# Patient Record
Sex: FEMALE | Race: WHITE | NOT HISPANIC OR LATINO | Employment: UNEMPLOYED | URBAN - METROPOLITAN AREA
[De-identification: names, ages, dates, MRNs, and addresses within clinical notes are randomized per-mention and may not be internally consistent; named-entity substitution may affect disease eponyms.]

---

## 2023-02-22 LAB
ALANINE AMINOTRANSFERASE (SGPT) (U/L) IN SER/PLAS: 13 U/L (ref 7–45)
ALBUMIN (G/DL) IN SER/PLAS: 4.7 G/DL (ref 3.4–5)
ALKALINE PHOSPHATASE (U/L) IN SER/PLAS: 99 U/L (ref 33–136)
ANION GAP IN SER/PLAS: 14 MMOL/L (ref 10–20)
ASPARTATE AMINOTRANSFERASE (SGOT) (U/L) IN SER/PLAS: 14 U/L (ref 9–39)
BASOPHILS (10*3/UL) IN BLOOD BY AUTOMATED COUNT: 0.04 X10E9/L (ref 0–0.1)
BASOPHILS/100 LEUKOCYTES IN BLOOD BY AUTOMATED COUNT: 0.5 % (ref 0–2)
BILIRUBIN TOTAL (MG/DL) IN SER/PLAS: 0.4 MG/DL (ref 0–1.2)
CALCIUM (MG/DL) IN SER/PLAS: 9.8 MG/DL (ref 8.6–10.6)
CARBON DIOXIDE, TOTAL (MMOL/L) IN SER/PLAS: 27 MMOL/L (ref 21–32)
CHLORIDE (MMOL/L) IN SER/PLAS: 105 MMOL/L (ref 98–107)
CREATININE (MG/DL) IN SER/PLAS: 0.74 MG/DL (ref 0.5–1.05)
EOSINOPHILS (10*3/UL) IN BLOOD BY AUTOMATED COUNT: 0.12 X10E9/L (ref 0–0.7)
EOSINOPHILS/100 LEUKOCYTES IN BLOOD BY AUTOMATED COUNT: 1.4 % (ref 0–6)
ERYTHROCYTE DISTRIBUTION WIDTH (RATIO) BY AUTOMATED COUNT: 13.5 % (ref 11.5–14.5)
ERYTHROCYTE MEAN CORPUSCULAR HEMOGLOBIN CONCENTRATION (G/DL) BY AUTOMATED: 33.2 G/DL (ref 32–36)
ERYTHROCYTE MEAN CORPUSCULAR VOLUME (FL) BY AUTOMATED COUNT: 94 FL (ref 80–100)
ERYTHROCYTES (10*6/UL) IN BLOOD BY AUTOMATED COUNT: 4.79 X10E12/L (ref 4–5.2)
GFR FEMALE: >90 ML/MIN/1.73M2
GLUCOSE (MG/DL) IN SER/PLAS: 81 MG/DL (ref 74–99)
HEMATOCRIT (%) IN BLOOD BY AUTOMATED COUNT: 45.2 % (ref 36–46)
HEMOGLOBIN (G/DL) IN BLOOD: 15 G/DL (ref 12–16)
IMMATURE GRANULOCYTES/100 LEUKOCYTES IN BLOOD BY AUTOMATED COUNT: 0.4 % (ref 0–0.9)
LEUKOCYTES (10*3/UL) IN BLOOD BY AUTOMATED COUNT: 8.4 X10E9/L (ref 4.4–11.3)
LYMPHOCYTES (10*3/UL) IN BLOOD BY AUTOMATED COUNT: 2.16 X10E9/L (ref 1.2–4.8)
LYMPHOCYTES/100 LEUKOCYTES IN BLOOD BY AUTOMATED COUNT: 25.7 % (ref 13–44)
MONOCYTES (10*3/UL) IN BLOOD BY AUTOMATED COUNT: 0.68 X10E9/L (ref 0.1–1)
MONOCYTES/100 LEUKOCYTES IN BLOOD BY AUTOMATED COUNT: 8.1 % (ref 2–10)
NEUTROPHILS (10*3/UL) IN BLOOD BY AUTOMATED COUNT: 5.37 X10E9/L (ref 1.2–7.7)
NEUTROPHILS/100 LEUKOCYTES IN BLOOD BY AUTOMATED COUNT: 63.9 % (ref 40–80)
NRBC (PER 100 WBCS) BY AUTOMATED COUNT: 0 /100 WBC (ref 0–0)
PLATELETS (10*3/UL) IN BLOOD AUTOMATED COUNT: 217 X10E9/L (ref 150–450)
POTASSIUM (MMOL/L) IN SER/PLAS: 4.1 MMOL/L (ref 3.5–5.3)
PROTEIN TOTAL: 7.3 G/DL (ref 6.4–8.2)
SODIUM (MMOL/L) IN SER/PLAS: 142 MMOL/L (ref 136–145)
UREA NITROGEN (MG/DL) IN SER/PLAS: 9 MG/DL (ref 6–23)

## 2023-06-15 LAB
ALANINE AMINOTRANSFERASE (SGPT) (U/L) IN SER/PLAS: 12 U/L (ref 7–45)
ALBUMIN (G/DL) IN SER/PLAS: 3.9 G/DL (ref 3.4–5)
ALKALINE PHOSPHATASE (U/L) IN SER/PLAS: 72 U/L (ref 33–136)
ANION GAP IN SER/PLAS: 9 MMOL/L (ref 10–20)
ASPARTATE AMINOTRANSFERASE (SGOT) (U/L) IN SER/PLAS: 10 U/L (ref 9–39)
BASOPHILS (10*3/UL) IN BLOOD BY AUTOMATED COUNT: 0.04 X10E9/L (ref 0–0.1)
BASOPHILS/100 LEUKOCYTES IN BLOOD BY AUTOMATED COUNT: 0.5 % (ref 0–2)
BILIRUBIN TOTAL (MG/DL) IN SER/PLAS: 0.2 MG/DL (ref 0–1.2)
CALCIUM (MG/DL) IN SER/PLAS: 9.1 MG/DL (ref 8.6–10.3)
CARBON DIOXIDE, TOTAL (MMOL/L) IN SER/PLAS: 28 MMOL/L (ref 21–32)
CHLORIDE (MMOL/L) IN SER/PLAS: 108 MMOL/L (ref 98–107)
CREATININE (MG/DL) IN SER/PLAS: 0.7 MG/DL (ref 0.5–1.05)
EOSINOPHILS (10*3/UL) IN BLOOD BY AUTOMATED COUNT: 0.15 X10E9/L (ref 0–0.7)
EOSINOPHILS/100 LEUKOCYTES IN BLOOD BY AUTOMATED COUNT: 2 % (ref 0–6)
ERYTHROCYTE DISTRIBUTION WIDTH (RATIO) BY AUTOMATED COUNT: 13.8 % (ref 11.5–14.5)
ERYTHROCYTE MEAN CORPUSCULAR HEMOGLOBIN CONCENTRATION (G/DL) BY AUTOMATED: 32.6 G/DL (ref 32–36)
ERYTHROCYTE MEAN CORPUSCULAR VOLUME (FL) BY AUTOMATED COUNT: 97 FL (ref 80–100)
ERYTHROCYTES (10*6/UL) IN BLOOD BY AUTOMATED COUNT: 4.15 X10E12/L (ref 4–5.2)
GFR FEMALE: >90 ML/MIN/1.73M2
GLUCOSE (MG/DL) IN SER/PLAS: 104 MG/DL (ref 74–99)
HEMATOCRIT (%) IN BLOOD BY AUTOMATED COUNT: 40.2 % (ref 36–46)
HEMOGLOBIN (G/DL) IN BLOOD: 13.1 G/DL (ref 12–16)
IMMATURE GRANULOCYTES/100 LEUKOCYTES IN BLOOD BY AUTOMATED COUNT: 0.5 % (ref 0–0.9)
LEUKOCYTES (10*3/UL) IN BLOOD BY AUTOMATED COUNT: 7.5 X10E9/L (ref 4.4–11.3)
LYMPHOCYTES (10*3/UL) IN BLOOD BY AUTOMATED COUNT: 1.89 X10E9/L (ref 1.2–4.8)
LYMPHOCYTES/100 LEUKOCYTES IN BLOOD BY AUTOMATED COUNT: 25.3 % (ref 13–44)
MONOCYTES (10*3/UL) IN BLOOD BY AUTOMATED COUNT: 0.59 X10E9/L (ref 0.1–1)
MONOCYTES/100 LEUKOCYTES IN BLOOD BY AUTOMATED COUNT: 7.9 % (ref 2–10)
NEUTROPHILS (10*3/UL) IN BLOOD BY AUTOMATED COUNT: 4.77 X10E9/L (ref 1.2–7.7)
NEUTROPHILS/100 LEUKOCYTES IN BLOOD BY AUTOMATED COUNT: 63.8 % (ref 40–80)
PLATELETS (10*3/UL) IN BLOOD AUTOMATED COUNT: 189 X10E9/L (ref 150–450)
POTASSIUM (MMOL/L) IN SER/PLAS: 3.9 MMOL/L (ref 3.5–5.3)
PROTEIN TOTAL: 6.4 G/DL (ref 6.4–8.2)
SODIUM (MMOL/L) IN SER/PLAS: 141 MMOL/L (ref 136–145)
UREA NITROGEN (MG/DL) IN SER/PLAS: 11 MG/DL (ref 6–23)

## 2023-08-08 LAB
ALANINE AMINOTRANSFERASE (SGPT) (U/L) IN SER/PLAS: 26 U/L (ref 7–45)
ALBUMIN (G/DL) IN SER/PLAS: 4.8 G/DL (ref 3.4–5)
ALKALINE PHOSPHATASE (U/L) IN SER/PLAS: 80 U/L (ref 33–136)
ANION GAP IN SER/PLAS: 16 MMOL/L (ref 10–20)
ASPARTATE AMINOTRANSFERASE (SGOT) (U/L) IN SER/PLAS: 18 U/L (ref 9–39)
BASOPHILS (10*3/UL) IN BLOOD BY AUTOMATED COUNT: 0.05 X10E9/L (ref 0–0.1)
BASOPHILS/100 LEUKOCYTES IN BLOOD BY AUTOMATED COUNT: 0.4 % (ref 0–2)
BILIRUBIN TOTAL (MG/DL) IN SER/PLAS: 0.4 MG/DL (ref 0–1.2)
CALCIUM (MG/DL) IN SER/PLAS: 9.9 MG/DL (ref 8.6–10.6)
CARBON DIOXIDE, TOTAL (MMOL/L) IN SER/PLAS: 25 MMOL/L (ref 21–32)
CHLORIDE (MMOL/L) IN SER/PLAS: 104 MMOL/L (ref 98–107)
CREATININE (MG/DL) IN SER/PLAS: 0.79 MG/DL (ref 0.5–1.05)
EOSINOPHILS (10*3/UL) IN BLOOD BY AUTOMATED COUNT: 0.04 X10E9/L (ref 0–0.7)
EOSINOPHILS/100 LEUKOCYTES IN BLOOD BY AUTOMATED COUNT: 0.3 % (ref 0–6)
ERYTHROCYTE DISTRIBUTION WIDTH (RATIO) BY AUTOMATED COUNT: 12.9 % (ref 11.5–14.5)
ERYTHROCYTE MEAN CORPUSCULAR HEMOGLOBIN CONCENTRATION (G/DL) BY AUTOMATED: 32.8 G/DL (ref 32–36)
ERYTHROCYTE MEAN CORPUSCULAR VOLUME (FL) BY AUTOMATED COUNT: 94 FL (ref 80–100)
ERYTHROCYTES (10*6/UL) IN BLOOD BY AUTOMATED COUNT: 4.92 X10E12/L (ref 4–5.2)
GFR FEMALE: 85 ML/MIN/1.73M2
GLUCOSE (MG/DL) IN SER/PLAS: 94 MG/DL (ref 74–99)
HEMATOCRIT (%) IN BLOOD BY AUTOMATED COUNT: 46.4 % (ref 36–46)
HEMOGLOBIN (G/DL) IN BLOOD: 15.2 G/DL (ref 12–16)
IMMATURE GRANULOCYTES/100 LEUKOCYTES IN BLOOD BY AUTOMATED COUNT: 0.4 % (ref 0–0.9)
LEUKOCYTES (10*3/UL) IN BLOOD BY AUTOMATED COUNT: 11.5 X10E9/L (ref 4.4–11.3)
LYMPHOCYTES (10*3/UL) IN BLOOD BY AUTOMATED COUNT: 2.39 X10E9/L (ref 1.2–4.8)
LYMPHOCYTES/100 LEUKOCYTES IN BLOOD BY AUTOMATED COUNT: 20.8 % (ref 13–44)
MONOCYTES (10*3/UL) IN BLOOD BY AUTOMATED COUNT: 0.53 X10E9/L (ref 0.1–1)
MONOCYTES/100 LEUKOCYTES IN BLOOD BY AUTOMATED COUNT: 4.6 % (ref 2–10)
NEUTROPHILS (10*3/UL) IN BLOOD BY AUTOMATED COUNT: 8.43 X10E9/L (ref 1.2–7.7)
NEUTROPHILS/100 LEUKOCYTES IN BLOOD BY AUTOMATED COUNT: 73.5 % (ref 40–80)
NRBC (PER 100 WBCS) BY AUTOMATED COUNT: 0 /100 WBC (ref 0–0)
PLATELETS (10*3/UL) IN BLOOD AUTOMATED COUNT: 222 X10E9/L (ref 150–450)
POTASSIUM (MMOL/L) IN SER/PLAS: 3.9 MMOL/L (ref 3.5–5.3)
PROTEIN TOTAL: 7.4 G/DL (ref 6.4–8.2)
SODIUM (MMOL/L) IN SER/PLAS: 141 MMOL/L (ref 136–145)
UREA NITROGEN (MG/DL) IN SER/PLAS: 8 MG/DL (ref 6–23)

## 2023-11-12 PROBLEM — K74.00 LIVER FIBROSIS: Status: ACTIVE | Noted: 2023-11-12

## 2023-11-12 PROBLEM — L30.9 DERMATITIS, UNSPECIFIED: Status: ACTIVE | Noted: 2023-08-10

## 2023-11-12 PROBLEM — D18.01 HEMANGIOMA OF SKIN AND SUBCUTANEOUS TISSUE: Status: ACTIVE | Noted: 2023-08-10

## 2023-11-12 PROBLEM — L81.4 OTHER MELANIN HYPERPIGMENTATION: Status: ACTIVE | Noted: 2023-08-10

## 2023-11-12 PROBLEM — Z85.828 PERSONAL HISTORY OF OTHER MALIGNANT NEOPLASM OF SKIN: Status: ACTIVE | Noted: 2023-08-10

## 2023-11-12 PROBLEM — C86.6 PRIMARY CUTANEOUS CD30-POSITIVE T-CELL PROLIFERATIONS (MULTI): Status: ACTIVE | Noted: 2023-08-10

## 2023-11-12 PROBLEM — L57.9 SKIN CHANGES DUE TO CHRONIC EXPOSURE TO NONIONIZING RADIATION, UNSPECIFIED: Status: ACTIVE | Noted: 2023-08-10

## 2023-11-12 PROBLEM — C86.60 PRIMARY CUTANEOUS CD30-POSITIVE T-CELL PROLIFERATIONS: Status: ACTIVE | Noted: 2023-08-10

## 2023-11-12 PROBLEM — D36.9 BENIGN NEOPLASM: Status: ACTIVE | Noted: 2023-08-10

## 2023-11-12 RX ORDER — CLINDAMYCIN PHOSPHATE 10 UG/ML
1 LOTION TOPICAL
COMMUNITY
Start: 2016-09-27

## 2023-11-12 RX ORDER — TRIAMCINOLONE ACETONIDE 0.25 MG/G
1 CREAM TOPICAL
COMMUNITY
Start: 2020-02-12

## 2023-11-12 RX ORDER — FOLIC ACID 1 MG/1
1 TABLET ORAL
COMMUNITY
Start: 2016-09-28

## 2023-11-12 RX ORDER — FLUOCINONIDE 0.5 MG/G
1 CREAM TOPICAL
COMMUNITY
Start: 2016-09-27

## 2023-12-04 ENCOUNTER — TELEPHONE (OUTPATIENT)
Dept: DERMATOLOGY | Facility: CLINIC | Age: 63
End: 2023-12-04
Payer: COMMERCIAL

## 2023-12-04 NOTE — TELEPHONE ENCOUNTER
Pt left message that Dr Redman had referred her to a Liver specialist , alex lost the information and would like to know who the Dr was  that she was supposed to see .

## 2023-12-07 ENCOUNTER — TELEPHONE (OUTPATIENT)
Dept: DERMATOLOGY | Facility: CLINIC | Age: 63
End: 2023-12-07
Payer: COMMERCIAL

## 2023-12-19 ENCOUNTER — HOSPITAL ENCOUNTER (EMERGENCY)
Facility: HOSPITAL | Age: 63
Discharge: HOME | End: 2023-12-19
Payer: COMMERCIAL

## 2023-12-19 ENCOUNTER — APPOINTMENT (OUTPATIENT)
Dept: RADIOLOGY | Facility: HOSPITAL | Age: 63
End: 2023-12-19
Payer: COMMERCIAL

## 2023-12-19 VITALS
SYSTOLIC BLOOD PRESSURE: 124 MMHG | OXYGEN SATURATION: 99 % | DIASTOLIC BLOOD PRESSURE: 75 MMHG | RESPIRATION RATE: 16 BRPM | HEIGHT: 62 IN | WEIGHT: 200 LBS | HEART RATE: 94 BPM | TEMPERATURE: 98 F | BODY MASS INDEX: 36.8 KG/M2

## 2023-12-19 DIAGNOSIS — S82.841A BIMALLEOLAR FRACTURE OF RIGHT ANKLE, CLOSED, INITIAL ENCOUNTER: Primary | ICD-10-CM

## 2023-12-19 PROCEDURE — 73610 X-RAY EXAM OF ANKLE: CPT | Mod: RT

## 2023-12-19 PROCEDURE — 73610 X-RAY EXAM OF ANKLE: CPT | Mod: RIGHT SIDE | Performed by: RADIOLOGY

## 2023-12-19 PROCEDURE — 2500000004 HC RX 250 GENERAL PHARMACY W/ HCPCS (ALT 636 FOR OP/ED)

## 2023-12-19 PROCEDURE — 96372 THER/PROPH/DIAG INJ SC/IM: CPT

## 2023-12-19 PROCEDURE — 2500000001 HC RX 250 WO HCPCS SELF ADMINISTERED DRUGS (ALT 637 FOR MEDICARE OP)

## 2023-12-19 PROCEDURE — 99284 EMERGENCY DEPT VISIT MOD MDM: CPT

## 2023-12-19 PROCEDURE — 99284 EMERGENCY DEPT VISIT MOD MDM: CPT | Mod: 25

## 2023-12-19 PROCEDURE — 73630 X-RAY EXAM OF FOOT: CPT | Mod: RIGHT SIDE | Performed by: RADIOLOGY

## 2023-12-19 PROCEDURE — 73630 X-RAY EXAM OF FOOT: CPT | Mod: RT

## 2023-12-19 PROCEDURE — 29515 APPLICATION SHORT LEG SPLINT: CPT | Mod: RT

## 2023-12-19 RX ORDER — DOCUSATE SODIUM 250 MG
250 CAPSULE ORAL DAILY PRN
Qty: 20 CAPSULE | Refills: 0 | Status: SHIPPED | OUTPATIENT
Start: 2023-12-19

## 2023-12-19 RX ORDER — MORPHINE SULFATE 4 MG/ML
INJECTION, SOLUTION INTRAMUSCULAR; INTRAVENOUS
Status: COMPLETED
Start: 2023-12-19 | End: 2023-12-19

## 2023-12-19 RX ORDER — IBUPROFEN 600 MG/1
600 TABLET ORAL ONCE
Status: COMPLETED | OUTPATIENT
Start: 2023-12-19 | End: 2023-12-19

## 2023-12-19 RX ORDER — OXYCODONE AND ACETAMINOPHEN 5; 325 MG/1; MG/1
1-2 TABLET ORAL EVERY 6 HOURS PRN
Qty: 15 TABLET | Refills: 0 | Status: SHIPPED | OUTPATIENT
Start: 2023-12-19

## 2023-12-19 RX ORDER — IBUPROFEN 600 MG/1
600 TABLET ORAL EVERY 6 HOURS PRN
Qty: 30 TABLET | Refills: 0 | Status: SHIPPED | OUTPATIENT
Start: 2023-12-19

## 2023-12-19 RX ORDER — MORPHINE SULFATE 4 MG/ML
4 INJECTION, SOLUTION INTRAMUSCULAR; INTRAVENOUS ONCE
Status: COMPLETED | OUTPATIENT
Start: 2023-12-19 | End: 2023-12-19

## 2023-12-19 RX ORDER — OXYCODONE AND ACETAMINOPHEN 5; 325 MG/1; MG/1
2 TABLET ORAL ONCE
Status: COMPLETED | OUTPATIENT
Start: 2023-12-19 | End: 2023-12-19

## 2023-12-19 RX ADMIN — MORPHINE SULFATE 4 MG: 4 INJECTION, SOLUTION INTRAMUSCULAR; INTRAVENOUS at 18:42

## 2023-12-19 RX ADMIN — IBUPROFEN 600 MG: 600 TABLET ORAL at 17:09

## 2023-12-19 RX ADMIN — OXYCODONE HYDROCHLORIDE AND ACETAMINOPHEN 2 TABLET: 5; 325 TABLET ORAL at 17:10

## 2023-12-19 ASSESSMENT — PAIN DESCRIPTION - LOCATION
LOCATION: ANKLE
LOCATION: ANKLE

## 2023-12-19 ASSESSMENT — PAIN DESCRIPTION - PAIN TYPE: TYPE: ACUTE PAIN

## 2023-12-19 ASSESSMENT — PAIN DESCRIPTION - ORIENTATION: ORIENTATION: RIGHT

## 2023-12-19 ASSESSMENT — PAIN - FUNCTIONAL ASSESSMENT
PAIN_FUNCTIONAL_ASSESSMENT: 0-10

## 2023-12-19 ASSESSMENT — PAIN SCALES - GENERAL
PAINLEVEL_OUTOF10: 10 - WORST POSSIBLE PAIN
PAINLEVEL_OUTOF10: 2
PAINLEVEL_OUTOF10: 10 - WORST POSSIBLE PAIN
PAINLEVEL_OUTOF10: 10 - WORST POSSIBLE PAIN

## 2023-12-19 NOTE — ED PROVIDER NOTES
Chief Complaint   Patient presents with    right ankle pain       63-year-old female arrives to the emergency department chief complaint of right ankle pain.  The patient was cleaning off her car, when she was walking around the car for the second time she slipped, states that she heard 2 pops and a crack, and fell to the ground.  Patient thought that she may have sprained her right ankle, however over the next couple of hours the swelling became profound, patient endorses a 10 out of 10 pain.  Patient is neurovascular intact in the right lower extremity.  Patient denies any other complaints or injury related to the fall.  Patient has significant circumferential soft tissue swelling around the ankle worse around the lateral malleolus.  The patient has been able to bear weight with significant pain while walking on her heel, pain is worse with flexion or extension.      History provided by:  Patient   used: No         PmHx, PsHx, Allergies, Family Hx, social Hx reviewed as documented    Given the focused nature of the complaint, only related components review of systems were evaluated, and abnormalities indicated in HPI    Physical Exam:    General: Patient is AAOx3, appears well developed, well nourished, is a good historian, answers questions appropriately    HEENT: head normocephalic, atraumatic, PERRLA, EOMs intact, oropharynx without erythema or exudate, buccal mucosa intact without lesions, TMs unremarkable, nose is patent bilateral    Pulmonary: CTAB, no accessory muscle use, able to speak full clear sentences    Cardiac: HRRR, no murmurs, rubs or gallops    GI: soft, non-tender, non-distended    Musculoskeletal: Right ankle pain per HPI, otherwise BEEBE, no joint effusions, clubbing or edema noted    Skin: Soft tissue swelling around the right ankle per HPI, otherwise intact, no lesions or rashes noted, turgor is good.    Medical Decision Making  This patient was seen in the emergency  department with an attending physician available at all times throughout their ED course    Primary consideration for this patient would be a right ankle sprain versus fracture, an x-ray will be used to further evaluate.  For the patient's 10 out of 10 pain, patient given 5-3 25 Percocet x 2 as well as 600 mg of ibuprofen    The patient's x-ray shows a bimalleolar fracture that is minimally displaced.  I consulted Dr. Ricks with orthopedics, the patient's imaging was reviewed, he recommended that the patient be placed in a posterior short leg with stirrup, nonweightbearing, placed on crutches and follow-up in 1 to 2 weeks to allow the swelling to minimize.      Prior to splinting, patient given an additional 4 mg of IM morphine for her pain.  Patient continues to endorse a 10 out of 10 pain despite medications given previously.    The patient approximately 40 minutes after the morphine tolerated splinting well, please see procedure note.  This was assisted by the medical student.  Patient is neurovascularly intact before and after the splint placement.    Primary nursing staff fitted patient with crutches, though difficult for the patient to ambulate with crutches, patient was able to ambulate.  Patient given a prescription of 600 mg ibuprofen as well as Percocet for breakthrough pain and Colace to be used to minimize constipation.    Patient will follow-up with Dr. Ricks calling tomorrow morning to establish an appointment    Patient is amenable to the plan of discharge as outlined above, all patient's questions pertaining to their ED course were answered in their entirety.  Strict return precautions were discussed with the patient and they verbalized understanding.  Further, it was made clear to the patient that from an emergent basis, all effort and testing was done to eliminate any imminent dangerous or potentially dangerous conditions of the patient however if their symptoms get much worse or feel  life-threatening, they are to return to the emergency department or call 911 immediately.    Amount and/or Complexity of Data Reviewed  Radiology: ordered. Decision-making details documented in ED Course.     Splint Application    Performed by: SERENA Painter  Authorized by: SERENA Painter    Consent:     Consent obtained:  Verbal    Consent given by:  Patient    Risks, benefits, and alternatives were discussed: yes      Risks discussed:  Discoloration, numbness, pain and swelling    Alternatives discussed:  No treatment  Universal protocol:     Procedure explained and questions answered to patient or proxy's satisfaction: yes      Relevant documents present and verified: yes      Test results available: yes      Imaging studies available: yes      Required blood products, implants, devices, and special equipment available: yes      Site/side marked: yes      Immediately prior to procedure a time out was called: yes      Patient identity confirmed:  Verbally with patient, arm band and hospital-assigned identification number  Pre-procedure details:     Distal neurologic exam:  Normal    Distal perfusion: distal pulses strong and brisk capillary refill    Procedure details:     Location:  Ankle    Ankle location:  R ankle    Strapping: no      Splint type:  Short leg and ankle stirrup    Supplies used: Ortho-Glass, Webril, Ace wrap.    Attestation: Splint applied and adjusted personally by me    Post-procedure details:     Distal neurologic exam:  Normal    Distal perfusion: distal pulses strong and brisk capillary refill      Procedure completion:  Tolerated well, no immediate complications    Post-procedure imaging: not applicable       Diagnoses as of 12/19/23 1957   Bimalleolar fracture of right ankle, closed, initial encounter       The patient has had the following imaging during this ER visit: XR ANKLE RIGHT 3+ VIEWS  XR FOOT RIGHT 3+ VIEWS     Patient History   No past medical history on  "file.  No past surgical history on file.  No family history on file.  Social History     Tobacco Use    Smoking status: Not on file    Smokeless tobacco: Not on file   Substance Use Topics    Alcohol use: Not on file    Drug use: Not on file       ED Triage Vitals [12/19/23 1648]   Temp Heart Rate Resp BP   36.7 °C (98 °F) 108 16 120/62      SpO2 Temp src Heart Rate Source Patient Position   98 % -- -- --      BP Location FiO2 (%)     -- --       Vitals:    12/19/23 1648 12/19/23 1840   BP: 120/62 124/75   Pulse: 108 94   Resp: 16    Temp: 36.7 °C (98 °F)    SpO2: 98% 99%   Weight: 90.7 kg (200 lb)    Height: 1.575 m (5' 2\")                [place], APRN-CNP  12/19/23 1957    "

## 2023-12-21 ENCOUNTER — TELEPHONE (OUTPATIENT)
Dept: ORTHOPEDIC SURGERY | Facility: CLINIC | Age: 63
End: 2023-12-21

## 2023-12-21 ENCOUNTER — OFFICE VISIT (OUTPATIENT)
Dept: ORTHOPEDIC SURGERY | Facility: CLINIC | Age: 63
End: 2023-12-21
Payer: COMMERCIAL

## 2023-12-21 VITALS — BODY MASS INDEX: 35.44 KG/M2 | HEIGHT: 63 IN | WEIGHT: 200 LBS

## 2023-12-21 DIAGNOSIS — S82.841A BIMALLEOLAR ANKLE FRACTURE, RIGHT, CLOSED, INITIAL ENCOUNTER: ICD-10-CM

## 2023-12-21 DIAGNOSIS — S82.841A BIMALLEOLAR ANKLE FRACTURE, RIGHT, CLOSED, INITIAL ENCOUNTER: Primary | ICD-10-CM

## 2023-12-21 PROCEDURE — 99203 OFFICE O/P NEW LOW 30 MIN: CPT | Performed by: SPECIALIST

## 2023-12-21 RX ORDER — ESCITALOPRAM OXALATE 20 MG/1
TABLET ORAL
COMMUNITY
Start: 2023-11-27

## 2023-12-21 RX ORDER — BUSPIRONE HYDROCHLORIDE 15 MG/1
TABLET ORAL
COMMUNITY
Start: 2023-10-02

## 2023-12-21 RX ORDER — RISPERIDONE 2 MG/1
TABLET ORAL
COMMUNITY
Start: 2023-11-27

## 2023-12-21 RX ORDER — LOSARTAN POTASSIUM 100 MG/1
1 TABLET ORAL DAILY
COMMUNITY
Start: 2019-04-25

## 2023-12-21 RX ORDER — ERGOCALCIFEROL 1.25 1/1
CAPSULE ORAL
COMMUNITY
Start: 2023-06-20

## 2023-12-21 RX ORDER — METHOTREXATE 2.5 MG/1
TABLET ORAL
COMMUNITY
Start: 2023-11-27

## 2023-12-21 RX ORDER — OXYCODONE AND ACETAMINOPHEN 5; 325 MG/1; MG/1
1 TABLET ORAL EVERY 6 HOURS PRN
Qty: 20 TABLET | Refills: 0 | Status: SHIPPED | OUTPATIENT
Start: 2023-12-21 | End: 2023-12-28

## 2023-12-21 RX ORDER — HYDROXYZINE PAMOATE 25 MG/1
CAPSULE ORAL
COMMUNITY
Start: 2023-10-30

## 2023-12-21 RX ORDER — AMLODIPINE BESYLATE 5 MG/1
1 TABLET ORAL DAILY
COMMUNITY
Start: 2019-03-28

## 2023-12-21 RX ORDER — BUSPIRONE HYDROCHLORIDE 10 MG/1
10 TABLET ORAL 2 TIMES DAILY
COMMUNITY
Start: 2022-12-12

## 2023-12-21 RX ORDER — ESCITALOPRAM OXALATE 10 MG/1
TABLET ORAL
COMMUNITY
Start: 2023-06-29

## 2023-12-21 NOTE — PROGRESS NOTES
Right ankle pain - states she slipped and fell 12/19/2023  Went to the ER - xrays done   NWB in a splint, states she has been using crutches.   Taking Ibuprofen and Percocet for pain.       On examination of the right ankle/foot:  Antalgic gait, nonweightbearing in splint  Alignment well aligned in splint.    Neurovascularly intact.  No sensory deficit to light touch.  Good perfusion and motor to toes  I personally reviewed the patient's x-ray images and reports of the right ankle.  She has a nondisplaced bimalleolar ankle fracture.  ASSESSMENT: Right bimalleolar ankle fracture 2 days out in splint well reduced.  She will remain nonweightbearing in the splint we will see her back in 2 weeks for repeat x-rays.  Despite it being nondisplaced these have a history of slipping out of anatomic position and therefore we will follow closely.  She understands it may require surgery in the future but she would be high risk with her current tobacco use.     FOLLOW UP 2 weeks repeat 3 views nonweightbearing in splint    This note was dictated using speech recognition software and was not corrected for spelling or grammatical errors

## 2023-12-21 NOTE — TELEPHONE ENCOUNTER
Patient seen today for Bimalleolar Ankle fracture   Asking for a prescription for pain medication   [phone] [facility]

## 2023-12-29 DIAGNOSIS — S82.841A BIMALLEOLAR ANKLE FRACTURE, RIGHT, CLOSED, INITIAL ENCOUNTER: ICD-10-CM

## 2024-01-03 ENCOUNTER — OFFICE VISIT (OUTPATIENT)
Dept: ORTHOPEDIC SURGERY | Facility: CLINIC | Age: 64
End: 2024-01-03
Payer: COMMERCIAL

## 2024-01-03 ENCOUNTER — HOSPITAL ENCOUNTER (OUTPATIENT)
Dept: RADIOLOGY | Facility: HOSPITAL | Age: 64
Discharge: HOME | End: 2024-01-03
Payer: COMMERCIAL

## 2024-01-03 VITALS — WEIGHT: 200 LBS | BODY MASS INDEX: 35.44 KG/M2 | HEIGHT: 63 IN

## 2024-01-03 DIAGNOSIS — S82.841A BIMALLEOLAR ANKLE FRACTURE, RIGHT, CLOSED, INITIAL ENCOUNTER: ICD-10-CM

## 2024-01-03 DIAGNOSIS — S82.841A BIMALLEOLAR ANKLE FRACTURE, RIGHT, CLOSED, INITIAL ENCOUNTER: Primary | ICD-10-CM

## 2024-01-03 PROCEDURE — 99213 OFFICE O/P EST LOW 20 MIN: CPT | Performed by: SPECIALIST

## 2024-01-03 PROCEDURE — 73610 X-RAY EXAM OF ANKLE: CPT | Mod: RIGHT SIDE | Performed by: RADIOLOGY

## 2024-01-03 PROCEDURE — L4361 PNEUMA/VAC WALK BOOT PRE OTS: HCPCS | Performed by: SPECIALIST

## 2024-01-03 PROCEDURE — 73610 X-RAY EXAM OF ANKLE: CPT | Mod: RT

## 2024-01-03 NOTE — PROGRESS NOTES
Follow up Right ankle fracture 12/19/2023  NWB in her splint   Xrays done today - she is not feeling any better     Patient is being treated for right by mall ankle fracture nonsurgically because of some comorbidities to include tobacco use.  She states she has been nonweightbearing.    Exam: With the right short leg splint removed she has minimal swelling intact dermis normal neurovascular status.  She does have pain bimalleolar palpation of the ankle.  No pain over Lisfranc's or proximal leg regions.  Active motion intact with mild pain.    Radiographs: Shows the bimalleolar fracture of the right ankle but still with the maintained ankle mortise and minimal to no displacement of the fractures    Assessment/plan: Right bimalleolar ankle fracture.  She was placed into a fracture boot toe-touch weightbearing only.  She understands her still a risk of delayed/malunion.  Would like to reassess with 3 views nonweightbearing x-rays right ankle in 2 weeks.

## 2024-01-11 DIAGNOSIS — S82.841A BIMALLEOLAR ANKLE FRACTURE, RIGHT, CLOSED, INITIAL ENCOUNTER: ICD-10-CM

## 2024-01-16 ENCOUNTER — OFFICE VISIT (OUTPATIENT)
Dept: ORTHOPEDIC SURGERY | Facility: CLINIC | Age: 64
End: 2024-01-16
Payer: COMMERCIAL

## 2024-01-16 ENCOUNTER — HOSPITAL ENCOUNTER (OUTPATIENT)
Dept: RADIOLOGY | Facility: HOSPITAL | Age: 64
Discharge: HOME | End: 2024-01-16
Payer: COMMERCIAL

## 2024-01-16 VITALS — HEIGHT: 63 IN | BODY MASS INDEX: 35.44 KG/M2 | WEIGHT: 200 LBS

## 2024-01-16 DIAGNOSIS — S82.841A BIMALLEOLAR ANKLE FRACTURE, RIGHT, CLOSED, INITIAL ENCOUNTER: ICD-10-CM

## 2024-01-16 DIAGNOSIS — S82.841A BIMALLEOLAR ANKLE FRACTURE, RIGHT, CLOSED, INITIAL ENCOUNTER: Primary | ICD-10-CM

## 2024-01-16 PROCEDURE — 99213 OFFICE O/P EST LOW 20 MIN: CPT | Performed by: SPECIALIST

## 2024-01-16 PROCEDURE — 73610 X-RAY EXAM OF ANKLE: CPT | Mod: RIGHT SIDE | Performed by: RADIOLOGY

## 2024-01-16 PROCEDURE — 73610 X-RAY EXAM OF ANKLE: CPT | Mod: RT

## 2024-01-16 NOTE — PROGRESS NOTES
Follow up Right ankle fracture 12/19/2023  NWB in a wheelchair, wearing her boot. States the boot is very uncomfortable/painful.     Xrays repeated.     Exam: Right ankle with decreased swelling intact neurovascular status.  Overall alignment within neutral.  Hypersensitive to mostly the lateral malleolus.  Active motion intact no other acute changes.    Radiographs: Show lateral malleolar fracture minimal displacement minimal healing since previous.  Most importantly she still has a stable ankle mortise.    Right lateral malleolus ankle fracture.  Mostly the patient is reassured that she is 1 month into 2 to 3-month healing process.  This can be quite delayed with increased pain sensitivity by ongoing tobacco use.  Will follow-up in a month with repeat three-view standing x-rays right ankle.  She was instructed on appropriate use of the boot using the air bladders, weightbearing as tolerated with the boot.

## 2024-01-17 ENCOUNTER — APPOINTMENT (OUTPATIENT)
Dept: ORTHOPEDIC SURGERY | Facility: CLINIC | Age: 64
End: 2024-01-17
Payer: COMMERCIAL

## 2024-02-08 DIAGNOSIS — S82.841A BIMALLEOLAR ANKLE FRACTURE, RIGHT, CLOSED, INITIAL ENCOUNTER: ICD-10-CM

## 2024-02-13 ENCOUNTER — HOSPITAL ENCOUNTER (OUTPATIENT)
Dept: RADIOLOGY | Facility: HOSPITAL | Age: 64
Discharge: HOME | End: 2024-02-13
Payer: COMMERCIAL

## 2024-02-13 ENCOUNTER — OFFICE VISIT (OUTPATIENT)
Dept: ORTHOPEDIC SURGERY | Facility: CLINIC | Age: 64
End: 2024-02-13
Payer: COMMERCIAL

## 2024-02-13 VITALS — HEIGHT: 63 IN | BODY MASS INDEX: 35.44 KG/M2 | WEIGHT: 200 LBS

## 2024-02-13 DIAGNOSIS — S82.841A BIMALLEOLAR ANKLE FRACTURE, RIGHT, CLOSED, INITIAL ENCOUNTER: ICD-10-CM

## 2024-02-13 PROCEDURE — 99213 OFFICE O/P EST LOW 20 MIN: CPT | Performed by: SPECIALIST

## 2024-02-13 PROCEDURE — 73610 X-RAY EXAM OF ANKLE: CPT | Mod: RIGHT SIDE | Performed by: RADIOLOGY

## 2024-02-13 PROCEDURE — 73610 X-RAY EXAM OF ANKLE: CPT | Mod: RT

## 2024-02-13 NOTE — PROGRESS NOTES
Follow up right lateral malleolus fracture 12/19/2023  States she is feeling a little better, has been walking in her boot.  Xrays done today.    Exam: Right ankle with minimal swelling good alignment stable range of motion.  Only mild discomfort over the fracture site.  Dermis intact neurovascular intact.  Negative Homans.    Radiographs: 3 view standing right ankle shows a stable ankle mortise and early progressive healing of the minimally displaced lateral malleolus fracture.    Assessment/plan: 2 months post closed management of right stable ankle fracture.  She can begin a course of physical therapy and wean from her boot as tolerated.  She understands full recovery can take several months.  I will see her back in 2 months no x-rays if doing well.

## 2024-02-29 ENCOUNTER — EVALUATION (OUTPATIENT)
Dept: PHYSICAL THERAPY | Facility: HOSPITAL | Age: 64
End: 2024-02-29
Payer: COMMERCIAL

## 2024-02-29 DIAGNOSIS — S82.841D CLOSED DISP BIMALLEOLAR FX OF RIGHT LOWER LEG WITH ROUTINE HEALING: Primary | ICD-10-CM

## 2024-02-29 PROCEDURE — 97161 PT EVAL LOW COMPLEX 20 MIN: CPT | Mod: GP | Performed by: PHYSICAL THERAPIST

## 2024-02-29 ASSESSMENT — ENCOUNTER SYMPTOMS
LOSS OF SENSATION IN FEET: 0
DEPRESSION: 0
OCCASIONAL FEELINGS OF UNSTEADINESS: 1

## 2024-02-29 ASSESSMENT — PAIN - FUNCTIONAL ASSESSMENT: PAIN_FUNCTIONAL_ASSESSMENT: 0-10

## 2024-02-29 ASSESSMENT — PAIN DESCRIPTION - DESCRIPTORS: DESCRIPTORS: ACHING
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
## 2024-03-05 ENCOUNTER — TELEPHONE (OUTPATIENT)
Dept: DERMATOLOGY | Facility: CLINIC | Age: 64
End: 2024-03-05
Payer: COMMERCIAL

## 2024-03-05 NOTE — TELEPHONE ENCOUNTER
Michael from [facility]  in [facility] pt needs refill for Folic Acid added pharmacy in pt chart

## 2024-03-06 ENCOUNTER — CLINICAL SUPPORT (OUTPATIENT)
Dept: PHYSICAL THERAPY | Facility: HOSPITAL | Age: 64
End: 2024-03-06
Payer: COMMERCIAL

## 2024-03-06 DIAGNOSIS — S82.841D CLOSED DISP BIMALLEOLAR FX OF RIGHT LOWER LEG WITH ROUTINE HEALING: Primary | ICD-10-CM

## 2024-03-06 PROCEDURE — 97110 THERAPEUTIC EXERCISES: CPT | Mod: GP,CQ

## 2024-03-06 ASSESSMENT — PAIN SCALES - GENERAL: PAINLEVEL_OUTOF10: 7

## 2024-03-06 ASSESSMENT — PAIN - FUNCTIONAL ASSESSMENT: PAIN_FUNCTIONAL_ASSESSMENT: 0-10

## 2024-03-06 NOTE — PROGRESS NOTES
Physical Therapy    Physical Therapy Treatment    Patient Name: Masha Blood  MRN: [id number]  Today's Date: 3/6/2024  Time Calculation  Start Time: 1050  Stop Time: 1130  Time Calculation (min): 40 min    PT Therapeutic Procedures Time Entry  Therapeutic Exercise Time Entry: 40        VISIT# 2    Current Problem  1. Closed disp bimalleolar fx of right lower leg with routine healing  Follow Up In Physical Therapy          Subjective   Pt is doing her HEP 3x day.       Precautions  Precautions  STEADI Fall Risk Score (The score of 4 or more indicates an increased risk of falling): 5  Medical Precautions:  (HTN, HA, migraines, ulceritive colitis, heart murmur, mental health - pt. recieves care. csections, appy, CTsx.)  Precautions Comment: wean from boot       Pain  Pain Assessment: 0-10  Pain Score: 7  Pain Location: Ankle  Pain Orientation: Right       Objective   Started land based therapy  Access Code: [email or web address]  URL: [email or web address]  Date: 03/06/2024  Prepared by: Abel Alamo    Exercises  - Seated Ankle Plantarflexion with Resistance (Mirrored)  - 1-2 x daily - 5-7 x weekly - 2 sets - 10 reps  - Seated Ankle Dorsiflexion with Resistance  - 1-2 x daily - 5-7 x weekly - 2 sets - 10 reps  - Seated Ankle Eversion with Resistance (Mirrored)  - 1-2 x daily - 5-7 x weekly - 2 sets - 10 reps  - Long Sitting Ankle Inversion with Anchored Resistance (Mirrored)  - 1-2 x daily - 5-7 x weekly - 2 sets - 10 reps    Treatments:  Visit 1: 2-29-24 Eval and HEP was issued. Lift given L shoe; tubigrip given R LE.   EXERCISES       Date 3/6/2024       VISIT# #2 # # #    REPS REPS REPS REPS          Nustep 10' @L1             Rkbd DF next             Rkbd  seated      Up / down 2 x 10       Inv / ever 2 x 10              // bars W/ boot W/O boot     Side amb 1 lap      Back amb 1 lap      Fwd amb 1 lap      March amb 1 lap      Amb with S.C. declines             3 way kicks              Step ups               sls              Shuttle HR  Next       4 way ankle 2 x 10 RTB      HEP X            Assessment:   Pt has a bit of pain with exercises.  She reports it is really shaky.  Pt will ice at home.   She had many questions about her healing.  Discussed with pt her healing time will be a bit less as she is a smoker.      Outpatient Education  Individual(s) Educated: Patient  Education Provided: Home Exercise Program  Equipment: Thera-Band  Risk and Benefits Discussed with Patient/Caregiver/Other: yes  Patient/Caregiver Demonstrated Understanding: yes  Plan of Care Discussed and Agreed Upon: yes  Patient Response to Education: Patient/Caregiver Verbalized Understanding of Information, Patient/Caregiver Performed Return Demonstration of Exercises/Activities, Patient/Caregiver Asked Appropriate Questions  Education Comment: Access Code: [email or web address]  URL: [email or web address]      Plan: Pt will bring a shoe next time so we can advance to standing exercises   OP PT Plan  Treatment/Interventions: Cryotherapy, Hot pack, Education/ Instruction, Self care/ home management, Therapeutic exercises, Therapeutic activities    Goals:  Active       MONICA malleoli fx       Pt. will c/o less than 2/10 R ankle pain with walking.        Start:  02/29/24    Expected End:  05/29/24            Pt. will be indep with HEP.        Start:  02/29/24    Expected End:  06/29/24            Pt. will increase R ankle AROM to wnl to allow amb. with heel strike.        Start:  02/29/24    Expected End:  06/29/24            Pt. will increase R ankle strength to allow up / down stairs wnl.        Start:  02/29/24    Expected End:  06/29/24            Pt. will amb. indep with good heel to toe pattern R without antalgia x 800 feet.        Start:  02/29/24    Expected End:  06/29/24

## 2024-03-11 ENCOUNTER — TREATMENT (OUTPATIENT)
Dept: PHYSICAL THERAPY | Facility: HOSPITAL | Age: 64
End: 2024-03-11
Payer: COMMERCIAL

## 2024-03-11 DIAGNOSIS — S82.841D CLOSED DISP BIMALLEOLAR FX OF RIGHT LOWER LEG WITH ROUTINE HEALING: Primary | ICD-10-CM

## 2024-03-11 PROCEDURE — 97110 THERAPEUTIC EXERCISES: CPT | Mod: GP | Performed by: PHYSICAL THERAPIST

## 2024-03-11 ASSESSMENT — PAIN - FUNCTIONAL ASSESSMENT: PAIN_FUNCTIONAL_ASSESSMENT: 0-10

## 2024-03-11 ASSESSMENT — PAIN SCALES - GENERAL: PAINLEVEL_OUTOF10: 7

## 2024-03-11 NOTE — PROGRESS NOTES
"Physical Therapy    Physical Therapy Treatment    Patient Name: Masha Blood  MRN: [id number]  Today's Date: 3/11/2024  Time Calculation  Start Time: 1040  Stop Time: 1125  Time Calculation (min): 45 min    PT Therapeutic Procedures Time Entry  Therapeutic Exercise Time Entry: 40  PT Modalities Time Entry  Hot/Cold Pack Time Entry: 5     VISIT# 3     Current Problem  1. Closed disp bimalleolar fx of right lower leg with routine healing  Follow Up In Physical Therapy          Subjective   Pt is doing her HEP 3x day.       Precautions  Precautions  Medical Precautions:  (HTN, HA, migraines, ulceritive colitis, heart murmur, mental health - pt. recieves care. csections, appy, CTsx.)  Precautions Comment: wean from boot       Pain  Pain Assessment: 0-10  Pain Score: 7  Pain Location: Ankle  Pain Orientation: Right       Objective   Pt. Wore shoe she brought. She amb. Into clinic with boot, but changed into shoe for all therEx.     Treatments:  Visit 1: 2-29-24 Eval and HEP was issued. Lift given L shoe; tubigrip given R LE.   EXERCISES       Date 3/11/2024  3/11/24     VISIT# #2 #3 # #    REPS REPS REPS REPS          Nustep 10' @L1 10' L 1             Rkbd DF next 30\"x3            Rkbd  seated Stand      Up / down 2 x 10  15x1     Inv / ever 2 x 10  15x1            // bars W/ boot W/O boot     Side amb 1 lap 1 lap     Back amb 1 lap 1 lap     Fwd amb 1 lap nt March amb 1 lap 1 lap     Amb with S.C. declines             3 way kicks  10x1 ea            Step ups              sls              Shuttle HR  Next  2B 10x2     4 way ankle 2 x 10 RTB      HEP X Ice today 5'           Assessment:   Pt tolerates most therEx with her shoe vs boot except R ankle pain with rocker board increase in 8/10 pain.             Plan:   Cont increase therEx without boot to increase strength and motion and function.      Goals:  Active       MONICA malleoli fx       Pt. will c/o less than 2/10 R ankle pain with walking.        Start:  " 02/29/24    Expected End:  05/29/24            Pt. will be indep with HEP.  (Progressing)       Start:  02/29/24    Expected End:  06/29/24            Pt. will increase R ankle AROM to wnl to allow amb. with heel strike.        Start:  02/29/24    Expected End:  06/29/24            Pt. will increase R ankle strength to allow up / down stairs wnl.        Start:  02/29/24    Expected End:  06/29/24            Pt. will amb. indep with good heel to toe pattern R without antalgia x 800 feet.        Start:  02/29/24    Expected End:  06/29/24
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
## 2024-03-19 ENCOUNTER — OFFICE VISIT (OUTPATIENT)
Dept: GASTROENTEROLOGY | Facility: CLINIC | Age: 64
End: 2024-03-19
Payer: COMMERCIAL

## 2024-03-19 VITALS
RESPIRATION RATE: 18 BRPM | TEMPERATURE: 98 F | WEIGHT: 203.71 LBS | OXYGEN SATURATION: 97 % | SYSTOLIC BLOOD PRESSURE: 119 MMHG | DIASTOLIC BLOOD PRESSURE: 76 MMHG | HEART RATE: 116 BPM | BODY MASS INDEX: 36.66 KG/M2

## 2024-03-19 DIAGNOSIS — K74.00 LIVER FIBROSIS: Primary | ICD-10-CM

## 2024-03-19 DIAGNOSIS — Z92.21 HISTORY OF METHOTREXATE THERAPY: ICD-10-CM

## 2024-03-19 PROCEDURE — 99204 OFFICE O/P NEW MOD 45 MIN: CPT | Performed by: STUDENT IN AN ORGANIZED HEALTH CARE EDUCATION/TRAINING PROGRAM

## 2024-03-19 NOTE — PATIENT INSTRUCTIONS
Masha Blood,     Thank you for coming in for your hepatology office visit today. As per our discussion, I recommend:     Have labs done for evaluation of the liver.   Have an ultrasound of the liver done, call [phone] to schedule this test.   Have a fibroscan of the liver done, call [phone] to schedule this test.     Recommendations for follow up will be determined pending results from the above testing.      If you have any trouble or need assistance with having this done, please reach out to my 's office at [phone] (Ms Prateren Bon) or my nurse coordinator at [phone] (Ms Leonela MARTINEZ).     I look forward to seeing you again. Thank you for allowing me to participate in your care.     Sincerely,  Nawaf Quintana MD  Hepatology

## 2024-03-21 ENCOUNTER — APPOINTMENT (OUTPATIENT)
Dept: PHYSICAL THERAPY | Facility: HOSPITAL | Age: 64
End: 2024-03-21
Payer: COMMERCIAL

## 2024-03-26 ENCOUNTER — TREATMENT (OUTPATIENT)
Dept: PHYSICAL THERAPY | Facility: HOSPITAL | Age: 64
End: 2024-03-26
Payer: COMMERCIAL

## 2024-03-26 DIAGNOSIS — S82.841D CLOSED DISP BIMALLEOLAR FX OF RIGHT LOWER LEG WITH ROUTINE HEALING: ICD-10-CM

## 2024-03-26 PROCEDURE — 97110 THERAPEUTIC EXERCISES: CPT | Mod: GP | Performed by: PHYSICAL THERAPIST

## 2024-03-26 ASSESSMENT — PAIN SCALES - GENERAL: PAINLEVEL_OUTOF10: 3

## 2024-03-26 ASSESSMENT — PAIN - FUNCTIONAL ASSESSMENT: PAIN_FUNCTIONAL_ASSESSMENT: 0-10

## 2024-03-26 NOTE — PROGRESS NOTES
"Physical Therapy    Physical Therapy Treatment    Patient Name: Masha Blood  MRN: [id number]  Today's Date: 3/26/2024  Time Calculation  Start Time: 1138  Stop Time: 1216  Time Calculation (min): 38 min    PT Therapeutic Procedures Time Entry  Therapeutic Exercise Time Entry: 38        VISIT# 3     Current Problem  1. Closed disp bimalleolar fx of right lower leg with routine healing  Follow Up In Physical Therapy          Subjective   Pt is doing her HEP 3x day.       Precautions  Precautions  Medical Precautions:  (HTN, HA, migraines, ulceritive colitis, heart murmur, mental health - pt. recieves care. csections, appy, CTsx.)       Pain  Pain Assessment: 0-10  Pain Score: 3  Pain Location: Ankle  Pain Orientation: Right       Objective   Pt. Wore shoe she brought. She amb. Into clinic with boot, but changed into shoe for all therEx.     Treatments:  Visit 1: 2-29-24 Eval and HEP was issued. Lift given L shoe; tubigrip given R LE.   EXERCISES       Date 3/26/2024  3/11/24 3/26/24    VISIT# #2 #3 #4 #    REPS REPS REPS REPS      All without boot    Nustep 10' @L1 10' L 1  10 L1           Rkbd DF next 30\"x3 30\"x3           Rkbd  seated Stand  stand    Up / down 2 x 10  15x1 15x2    Inv / ever 2 x 10  15x1 15x2           // bars W/ boot W/O boot     Side amb 1 lap 1 lap 2 laps    Back amb 1 lap 1 lap 2 laps    Fwd amb 1 lap nt March amb 1 lap 1 lap 2 laps    Amb with [place]. declines             3 way kicks  10x1 ea 10x1 ea           Isometric aga ball       ever   3\"x10x2    inver   3\"10x2    Step ups              sls              Shuttle HR  Next  2B 10x2     4 way ankle 2 x 10 RTB      HEP X Ice today 5' Pt. [place] do at home          Assessment:   Pt tolerates all therEx with less pain 2/10 R ankle.             Plan:   Cont increase therEx without boot to increase strength and motion and function.      Goals:  Active       MONICA malleoli fx       Pt. will c/o less than 2/10 R ankle pain with walking.        Start: "  02/29/24    Expected End:  05/29/24            Pt. will be indep with HEP.  (Progressing)       Start:  02/29/24    Expected End:  06/29/24            Pt. will increase R ankle AROM to wnl to allow amb. with heel strike.        Start:  02/29/24    Expected End:  06/29/24            Pt. will increase R ankle strength to allow up / down stairs wnl.        Start:  02/29/24    Expected End:  06/29/24            Pt. will amb. indep with good heel to toe pattern R without antalgia x 800 feet.        Start:  02/29/24    Expected End:  06/29/24

## 2024-04-02 ENCOUNTER — TREATMENT (OUTPATIENT)
Dept: PHYSICAL THERAPY | Facility: HOSPITAL | Age: 64
End: 2024-04-02
Payer: COMMERCIAL

## 2024-04-02 DIAGNOSIS — S82.841D CLOSED DISP BIMALLEOLAR FX OF RIGHT LOWER LEG WITH ROUTINE HEALING: Primary | ICD-10-CM

## 2024-04-02 PROCEDURE — 97110 THERAPEUTIC EXERCISES: CPT | Mod: GP,CQ

## 2024-04-02 ASSESSMENT — PAIN SCALES - GENERAL: PAINLEVEL_OUTOF10: 5 - MODERATE PAIN

## 2024-04-02 ASSESSMENT — PAIN - FUNCTIONAL ASSESSMENT: PAIN_FUNCTIONAL_ASSESSMENT: 0-10

## 2024-04-02 NOTE — PROGRESS NOTES
"Physical Therapy    Physical Therapy Treatment    Patient Name: Masha Blood  MRN: [id number]  Today's Date: 4/2/2024  Time Calculation  Start Time: 0145  Stop Time: 0230  Time Calculation (min): 45 min    PT Therapeutic Procedures Time Entry  Therapeutic Exercise Time Entry: 45        VISIT# 5    Current Problem  1. Closed disp bimalleolar fx of right lower leg with routine healing  Follow Up In Physical Therapy          Subjective   Pt reports she removes her boot a few times when she is sitting down at home.       Precautions  Precautions  STEADI Fall Risk Score (The score of 4 or more indicates an increased risk of falling): unchanged  Medical Precautions:  (HTN, HA, migraines, ulceritive colitis, heart murmur, mental health - pt. recieves care. csections, appy, CTsx.)  Precautions Comment: wean from boot       Pain  Pain Assessment: 0-10  Pain Score: 5 - Moderate pain  Pain Location: Ankle  Pain Orientation: Right       Objective   Amb and exercised without the use of her boot.  Formulated a plan to decrease her use of the boot  Up and down stairs alternating pattern  Indp with current HEP     Treatments:  Visit 1: 2-29-24 Eval and HEP was issued. Lift given L shoe; tubigrip given R LE.   EXERCISES     Date 3/26/24 4/2/24   VISIT# #4 #5    REPS REPS    All without boot    Nustep 10 L1 10' @L2        Rkbd DF 30\"x3 30\" x 3        Rkbd  stand    Up / down 15x2    Inv / ever 15x2         // bars     Side amb 2 laps 2 laps   Back amb 2 laps 2 laps   Fwd amb  2 laps   March amb 2 laps 2 laps   Tandem amb  2 laps        3 way kicks 10x1 ea 10x 2 ea nigel        Isometric aga ball     ever 3\"x10x2    inver 3\"10x2    Step ups          sls          Shuttle HR      4 way ankle     HEP Pt. Will do at home        Assessment:   Pt is able to go up and down a small flight of stairs, alternating with cues and using nigel HRA's n this date.   Pt will remove her boot when she is at home.       Plan:  Progress stairs and walking " without wearing her boot  OP PT Plan  Treatment/Interventions: Cryotherapy, Hot pack, Education/ Instruction, Self care/ home management, Therapeutic exercises, Therapeutic activities      Goals:  Active       MONICA eduardo fx       Pt. will c/o less than 2/10 R ankle pain with walking.        Start:  02/29/24    Expected End:  05/29/24            Pt. will be indep with HEP.  (Met)       Start:  02/29/24    Expected End:  06/29/24    Resolved:  04/02/24         Pt. will increase R ankle AROM to wnl to allow amb. with heel strike.        Start:  02/29/24    Expected End:  06/29/24            Pt. will increase R ankle strength to allow up / down stairs wnl.  (Progressing)       Start:  02/29/24    Expected End:  06/29/24            Pt. will amb. indep with good heel to toe pattern R without antalgia x 800 feet.  (Progressing)       Start:  02/29/24    Expected End:  06/29/24

## 2024-04-08 DIAGNOSIS — S82.841A BIMALLEOLAR ANKLE FRACTURE, RIGHT, CLOSED, INITIAL ENCOUNTER: ICD-10-CM

## 2024-04-16 ENCOUNTER — HOSPITAL ENCOUNTER (OUTPATIENT)
Dept: RADIOLOGY | Facility: HOSPITAL | Age: 64
Discharge: HOME | End: 2024-04-16
Payer: COMMERCIAL

## 2024-04-16 ENCOUNTER — OFFICE VISIT (OUTPATIENT)
Dept: ORTHOPEDIC SURGERY | Facility: CLINIC | Age: 64
End: 2024-04-16
Payer: COMMERCIAL

## 2024-04-16 DIAGNOSIS — S82.841A BIMALLEOLAR ANKLE FRACTURE, RIGHT, CLOSED, INITIAL ENCOUNTER: Primary | ICD-10-CM

## 2024-04-16 DIAGNOSIS — S82.841A BIMALLEOLAR ANKLE FRACTURE, RIGHT, CLOSED, INITIAL ENCOUNTER: ICD-10-CM

## 2024-04-16 PROCEDURE — 99213 OFFICE O/P EST LOW 20 MIN: CPT | Performed by: SPECIALIST

## 2024-04-16 PROCEDURE — 73610 X-RAY EXAM OF ANKLE: CPT | Mod: RT

## 2024-04-16 PROCEDURE — 73610 X-RAY EXAM OF ANKLE: CPT | Mod: RIGHT SIDE | Performed by: RADIOLOGY

## 2024-04-16 NOTE — PROGRESS NOTES
Follow up right lateral malleolus fracture 12/19/2023  States she is improving  has been walking in her boot still but takes off when she is at home and she is still doing PT     Exam: Right ankle with neutral alignment minimal swelling no erythema no ecchymosis.  Dermis intact, neurovascular status intact.  Full painless stable range of motion.    Radiographs: 3 view standing right ankle show stable alignment with well-healing fracture no other acute abnormalities.    Assessment plan: Healing right ankle fracture.  She can continue to wean from her boot as tolerated continue with a home therapy program as outlined through PT.  Follow-up final in 3 months cancel if doing well.

## 2024-04-18 ENCOUNTER — TREATMENT (OUTPATIENT)
Dept: PHYSICAL THERAPY | Facility: HOSPITAL | Age: 64
End: 2024-04-18
Payer: COMMERCIAL

## 2024-04-18 DIAGNOSIS — S82.841D CLOSED DISP BIMALLEOLAR FX OF RIGHT LOWER LEG WITH ROUTINE HEALING: Primary | ICD-10-CM

## 2024-04-18 PROCEDURE — 97110 THERAPEUTIC EXERCISES: CPT | Mod: GP,CQ

## 2024-04-18 ASSESSMENT — PAIN SCALES - GENERAL: PAINLEVEL_OUTOF10: 3

## 2024-04-18 ASSESSMENT — PAIN - FUNCTIONAL ASSESSMENT: PAIN_FUNCTIONAL_ASSESSMENT: 0-10

## 2024-04-18 NOTE — PROGRESS NOTES
"Physical Therapy    Physical Therapy Treatment    Patient Name: Masha Blood  MRN: [id number]  Today's Date: 4/18/2024  Time Calculation  Start Time: 1100  Stop Time: 1143  Time Calculation (min): 43 min    PT Therapeutic Procedures Time Entry  Therapeutic Exercise Time Entry: 43        VISIT# 6    Current Problem  1. Closed disp bimalleolar fx of right lower leg with routine healing  Follow Up In Physical Therapy          Subjective   Pt saw the doctor and reports she is still wearing her boot for long distances.      Precautions  Precautions  STEADI Fall Risk Score (The score of 4 or more indicates an increased risk of falling): unchanged  Medical Precautions:  (HTN, HA, migraines, ulceritive colitis, heart murmur, mental health - pt. recieves care. csections, appy, CTsx.)  Precautions Comment: wean from boot       Pain  Pain Assessment: 0-10  Pain Score: 3  Pain Location: Ankle  Pain Orientation: Right       Objective   Stairs:  Up alternating    Stairs:  Down alternating is painful  marking time feels better.     Pt amb 340 ft no boot and pain was 2/10 on this date.      Treatments:  Visit 1: 2-29-24 Eval and HEP was issued. Lift given L shoe; tubigrip given R LE.   EXERCISES      Date 3/26/24 4/2/24 4/18/24   VISIT# #4 #5 #6    REPS REPS     All without boot     Nustep 10 L1 10' @L2 10' @L3         Rkbd DF 30\"x3 30\" x 3    Wobble board: sit  CW/CCW  Inv/Ev       2 x 10   2 x 10    Rkbd  stand     Up / down 15x2     Inv / ever 15x2           // bars      Side amb 2 laps 2 laps 2 laps 0UE   Back amb 2 laps 2 laps 2 laps 1-0 UE   Fwd amb  2 laps 1 lap in the clinic   170'   March amb 2 laps 2 laps 2 laps 0UE   Tandem amb  2 laps 2 laps 1 UE/fingers         3 way kicks 10x1 ea 10x 2 ea nigel          Isometric aga ball      ever 3\"x10x2     inver 3\"10x2     Step ups   Stairs with HRA x 3         sls            Shuttle HR       4 way ankle      HEP Pt. Will do at home         Assessment:   Pt is improving with her " walking.  She reports she has stairs at home.  It hurts to walk down the stairs alternating.  Pt will alternate up the steps and yuri time coming down especially on the [employer] bus.  We will continue to wean pt out of her boot per doctor's last note.      Plan:  Check ROM next visit    OP PT Plan  Treatment/Interventions: Cryotherapy, Hot pack, Education/ Instruction, Self care/ home management, Therapeutic exercises, Therapeutic activities    Goals:  Active       MONICA malleoli fx       Pt. will c/o less than 2/10 R ankle pain with walking.  (Progressing)       Start:  02/29/24    Expected End:  05/29/24            Pt. will be indep with HEP.  (Met)       Start:  02/29/24    Expected End:  06/29/24    Resolved:  04/02/24         Pt. will increase R ankle AROM to wnl to allow amb. with heel strike.  (Progressing)       Start:  02/29/24    Expected End:  06/29/24            Pt. will increase R ankle strength to allow up / down stairs wnl.  (Progressing)       Start:  02/29/24    Expected End:  06/29/24            Pt. will amb. indep with good heel to toe pattern R without antalgia x 800 feet.  (Progressing)       Start:  02/29/24    Expected End:  06/29/24

## 2024-04-23 ENCOUNTER — APPOINTMENT (OUTPATIENT)
Dept: PHYSICAL THERAPY | Facility: HOSPITAL | Age: 64
End: 2024-04-23
Payer: COMMERCIAL

## 2024-04-30 ENCOUNTER — TREATMENT (OUTPATIENT)
Dept: PHYSICAL THERAPY | Facility: HOSPITAL | Age: 64
End: 2024-04-30
Payer: COMMERCIAL

## 2024-04-30 DIAGNOSIS — S82.841D CLOSED DISP BIMALLEOLAR FX OF RIGHT LOWER LEG WITH ROUTINE HEALING: ICD-10-CM

## 2024-04-30 PROCEDURE — 97110 THERAPEUTIC EXERCISES: CPT | Mod: GP | Performed by: PHYSICAL THERAPIST

## 2024-04-30 ASSESSMENT — PAIN - FUNCTIONAL ASSESSMENT: PAIN_FUNCTIONAL_ASSESSMENT: 0-10

## 2024-04-30 ASSESSMENT — PAIN SCALES - GENERAL: PAINLEVEL_OUTOF10: 0 - NO PAIN

## 2024-04-30 NOTE — PROGRESS NOTES
"Physical Therapy    Physical Therapy Treatment - recheck and discharge    Patient Name: Masha Blood  MRN: [id number]  Today's Date: 4/30/2024  Time Calculation  Start Time: 1145  Stop Time: 1220  Time Calculation (min): 35 min    PT Therapeutic Procedures Time Entry  Therapeutic Exercise Time Entry: 35        VISIT# 7    Current Problem  1. Closed disp bimalleolar fx of right lower leg with routine healing  Follow Up In Physical Therapy          Subjective   Pt saw the doctor and reports she is still wearing her boot for outside her apartment. Pt. States she is fearful to not use her boot out of her apartment.      Precautions  Precautions  STEADI Fall Risk Score (The score of 4 or more indicates an increased risk of falling): un changed  Medical Precautions:  (HTN, HA, migraines, ulceritive colitis, heart murmur, mental health - pt. recieves care. csections, appy, CTsx.)       Pain  Pain Assessment: 0-10  Pain Score: 0 - No pain  Pain Location: Ankle  Pain Orientation: Right       Objective   Stairs:  Up alternating    Stairs:  Down alternating is painful  marking time feels better.     Pt amb 340 ft no boot and pain was 2/10 on this date.      Posture:  wfl         Range of Motion:  AROM : ankle R  DF: 20  PF 60  Inv: 50  Ever 25     Strength:  L LE wnl    R hip 5/5  R knee ext 5/5  R knee flex 5/5    R ankle : 5/5            Palpation:  Pt. Without TTT MONICA malleoli area    Ankle joint mobs without pain.                Treatments:  Visit 1: 2-29-24 Eval and HEP was issued. Lift given L shoe; tubigrip given R LE.   EXERCISES    Recheck and discharge   Date 3/26/24 4/2/24 4/18/24 4/30   VISIT# #4 #5 #6 #7    REPS REPS      All without boot      Nustep 10 L1 10' @L2 10' @L3 10'L4          Rkbd DF 30\"x3 30\" x 3  30\"x3   Wobble board: sit  CW/CCW  Inv/Ev       2 x 10   2 x 10  20x2 inv/ever   Rkbd  stand      Up / down 15x2      Inv / ever 15x2             // bars       Side amb 2 laps 2 laps 2 laps 0UE    Back amb " "2 laps 2 laps 2 laps 1-0 UE    Fwd amb  2 laps 1 lap in the clinic   170'    March amb 2 laps 2 laps 2 laps 0UE    Tandem amb  2 laps 2 laps 1 UE/fingers           3 way kicks 10x1 ea 10x 2 ea [place]            Isometric aga ball       ever 3\"x10x2      inver 3\"10x2      Step ups   Stairs with HRA x 3           sls    15\"x5   Heel raises    20x2   Shuttle HR        4 way ankle       HEP Pt. Will do at home          Assessment:   Pt is wnl R ankle and LE strength, R ankle ROM, and is wfl gait. She was advised to begin walking without the boot more and try safe environments with less people first like outside her apartment, sidewalk. Pt. Will cont. Indep HEP.     Plan:  Discharge PT       Goals:  Active       MONICA malleoli fx       Pt. will c/o less than 2/10 R ankle pain with walking.  (Met)       Start:  02/29/24    Expected End:  05/29/24    Resolved:  04/30/24         Pt. will be indep with HEP.  (Met)       Start:  02/29/24    Expected End:  06/29/24    Resolved:  04/02/24         Pt. will increase R ankle AROM to wnl to allow amb. with heel strike.  (Met)       Start:  02/29/24    Expected End:  06/29/24    Resolved:  04/30/24         Pt. will increase R ankle strength to allow up / down stairs wnl.  (Met)       Start:  02/29/24    Expected End:  06/29/24    Resolved:  04/30/24         Pt. will amb. indep with good heel to toe pattern R without antalgia x 800 feet.  (Progressing)       Start:  02/29/24    Expected End:  06/29/24               "

## 2024-05-07 ENCOUNTER — APPOINTMENT (OUTPATIENT)
Dept: PHYSICAL THERAPY | Facility: HOSPITAL | Age: 64
End: 2024-05-07
Payer: COMMERCIAL

## 2024-05-07 ENCOUNTER — TELEPHONE (OUTPATIENT)
Dept: DERMATOLOGY | Facility: CLINIC | Age: 64
End: 2024-05-07

## 2024-05-07 NOTE — TELEPHONE ENCOUNTER
Maci [facility] left message that she sent message a week ago for pt refill of methotrexate 2.5mg tablets , pt takes 6 tablets once weekly .. They are requesting  to send to [facility] electronically or call it into pharmacy ?? [phone] .. Please advise  looks like t has not been seen since 08/2023 , and labs are not recent  ..

## 2024-05-16 ENCOUNTER — TELEPHONE (OUTPATIENT)
Dept: DERMATOLOGY | Facility: CLINIC | Age: 64
End: 2024-05-16
Payer: COMMERCIAL

## 2024-05-16 NOTE — TELEPHONE ENCOUNTER
Pt needs appt with Dr Redman at [facility]  for renewal of her methotrexate .. ...

## 2024-06-03 NOTE — PROGRESS NOTES
"Subjective     Masha Blood is a 64 y.o. female who presents for the following: Follow-up.  She has been on Methotrexate 15 mg PO once weekly as well as Folic Acid 1 mg PO once daily except the day she takes MTX since her last visit in our office on 8/8/23 with significant improvement and good control, however, she states she ran out of Methotrexate 1 week ago and has been flaring on her right abdomen, right forearm, and left shoulder since.    She denies any flares since her last visit while on Methotrexate, and states she had been tolerating the Methotrexate well without any side effects noted.    She had previously been on Methotrexate 15 mg PO once weekly after her dose was increased from her previous dose of 10 mg to her current dose of 15 mg once weekly at her prior visit on 2/6/19 until she ran out of Methotrexate in early October 2021 and then underwent left ankle surgery and had a \"nervous breakdown,\" but she states her condition had been significantly improved and well-controlled again since resuming systemic therapy with Methotrexate following her visit on 8/16/22.    Of note, she had her most recent CBC with differential drawn on 8/23/23 and CMP drawn most recently on 5/1/24 which were essentially within normal limits except slightly elevated glucose 118 and chloride 106 and slightly low albumin 3.8.  She also had a consultation visit with Dr. Paul James in hepatology on 6/17/20, who recommended she have a Fibroscan ultrasound performed to evaluate for liver fibrosis, which she has not yet completed due to limitations during the COVID-19 pandemic, and she also states she had been hospitalized several times for her psychiatric conditions, including bipolar disorder, depression, and anxiety, but then had a visit scheduled with Dr. Nawaf Quintana in Hepatology for 12/8/23.      Review of Systems:  No other skin or systemic complaints other than what is documented elsewhere in the note.    The " "following portions of the chart were reviewed this encounter and updated as appropriate:       Skin Cancer History  No skin cancer on file.    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  - no history of eczema, psoriasis, skin cancer, leukemia, or lymphoma     Family History:    No family history of eczema, psoriasis, leukemia, or lymphoma     Social History:    The patient used to live temporarily at a hotel in [place] and then started a new job as a  at [employer] and moved into an apartment in [place]; she states she underwent left ankle surgery in February 2022 and then had a \"nervous breakdown\" and was living in a homeless shelter, but states she is now back in her own apartment    Allergies:  Meperidine, Tramadol, and Gabapentin    Current Medications / CAM's:    Current Outpatient Medications:     amLODIPine (Norvasc) 5 mg tablet, Take 1 tablet (5 mg) by mouth once daily., Disp: , Rfl:     benzoyl peroxide 5 % lotion, Apply 1 Application topically., Disp: , Rfl:     busPIRone (Buspar) 10 mg tablet, Take 1 tablet (10 mg) by mouth twice a day., Disp: , Rfl:     busPIRone (Buspar) 15 mg tablet, , Disp: , Rfl:     clindamycin (Cleocin T) 1 % lotion, Apply 1 Application topically., Disp: , Rfl:     docusate sodium 250 mg capsule, Take 1 capsule (250 mg) by mouth once daily as needed (To be used while narcotics are being taken)., Disp: 20 capsule, Rfl: 0    escitalopram (Lexapro) 10 mg tablet, , " Disp: , Rfl:     escitalopram (Lexapro) 20 mg tablet, , Disp: , Rfl:     fluocinonide 0.05 % cream, Apply 1 Application topically., Disp: , Rfl:     folic acid (Folvite) 1 mg tablet, Take 1 tablet (1 mg) by mouth., Disp: , Rfl:     hydrOXYzine pamoate (Vistaril) 25 mg capsule, , Disp: , Rfl:     ibuprofen 600 mg tablet, Take 1 tablet (600 mg) by mouth every 6 hours if needed for mild pain (1 - 3)., Disp: 30 tablet, Rfl: 0    losartan (Cozaar) 100 mg tablet, Take 1 tablet (100 mg) by mouth once daily., Disp: , Rfl:     methotrexate (Trexall) 2.5 mg tablet, , Disp: , Rfl:     oxyCODONE-acetaminophen (Percocet) 5-325 mg tablet, Take 1-2 tablets by mouth every 6 hours if needed for severe pain (7 - 10)., Disp: 15 tablet, Rfl: 0    risperiDONE (RisperDAL) 2 mg tablet, , Disp: , Rfl:     triamcinolone (Kenalog) 0.025 % cream, Apply 1 Application topically., Disp: , Rfl:     Vitamin D2 1,250 mcg (50,000 unit) capsule, , Disp: , Rfl:      Objective   Well appearing patient in no apparent distress; mood and affect are within normal limits.    A waist-up examination was performed including scalp, face, eyes, ears, nose, lips, neck, chest, axillae, abdomen, back, and bilateral upper extremities. All findings within normal limits unless otherwise noted below.        Assessment/Plan   1. Lymphomatoid papulosis (Multi)  Right Abdomen (side) - Upper  On her right abdomen, right forearm, and left anterior shoulder, there are several grouped erythematous papules, some with a central crust, grouped in each location.  Scattered on her trunk and bilateral upper extremities, there are several similar-appearing pink to hyperpigmented macules consistent with postinflammatory hyperpigmentation.    CBC and Auto Differential    Comprehensive metabolic panel    Lymphomatoid Papulosis - previously significantly improved and well-controlled since increasing her dose of Methotrexate on 2/6/19 and since re-starting MTX in August 2016, but then  with progressively worsening flare since self-discontinuation in early October 2021 and now significantly improved and well-controlled again since resuming systemic therapy with Methotrexate following her visit on 8/16/22.  The potentially chronic and intermittently flaring nature of this condition and treatment options were discussed extensively with the patient again today.  We again spent a great deal of time discussing various treatment options, including topical therapy, UV phototherapy, and systemic therapy, such as with Methotrexate, which she restarted in August 2016 after achieving significant improvement and good control while first on the medication for several months.  After discussing the risks, benefits, and side effects of each of these options at length, including hepatotoxicity and multiple additional potential side effects associated with Methotrexate, the patient expressed understanding and wishes to resume her systemic therapy with her most recent increased dose of Methotrexate 15 mg PO once weekly.    Of note, she had her most recent set of routine monitoring labs, including CBC w/ Diff and CMP, drawn on 8/23/23 and 5/1/24, respectively, which were within acceptable limits, and she states she plans to have routine monitoring labs drawn again today, which we will follow-up, and, given her condition has improved significantly since restarting therapy and she states she has been tolerating the medication well again without any side effects noted, if her routine monitoring labs remain within acceptable limits, I recommend she continue her current systemic therapy with Methotrexate 15 mg PO once weekly as well as Folic Acid 1 mg PO once daily except the day she takes MTX, which she takes on Fridays.  She was also instructed to have routine monitoring labs drawn once every 3 months.    In addition, for the new lesions on her right abdomen, right forearm, and left shoulder, I recommend she resume  combination topical therapy with Clindamycin 1% lotion and Fluocinonide 0.05% cream, which she was instructed to apply twice daily to the affected lesions on her body (avoid face, groin, body folds) for the next 2 weeks, followed by taper to twice daily on weekends only for persistent lesions; she may repeat treatment in a 2-week burst-and-taper fashion every 6-8 weeks as needed for new lesions or future flares.  The risks, benefits, and side effects of these medications, including possible skin atrophy with overuse of topical steroids, were discussed.    In addition, given her long-term Methotrexate therapy, she was again instructed to follow-up with Hepatology here at , such as Dr. Paul James, who she saw on 6/17/20 for evaluation for possible Methotrexate-induced hepatotoxicity and who recommended she have a Fibroscan ultrasound performed, which she states she had planned to schedule once she got a car for transportation, but was then postponed due to the COVID-19 pandemic, and she recently had a visit with Dr. Nawaf Quintana in Hepatology on 3/19/24.  She was also again instructed to follow-up with her primary care physician, Dr. Nico Van, to discuss her previously elevated serum glucose level, and she was instructed to return to our office in 2-3 months for re-evaluation and to have repeat monitoring labs drawn again at that time (every 3 months).  She expressed understanding and is inagreement with this plan.    2. Diffuse photodamage of skin  Photodistributed  Diffuse photodamage with actinic changes with telangiectasia and mottled pigmentation in sun-exposed areas.    Photodamage.  The signs and symptoms of skin cancer were reviewed and the patient was advised to practice sun protection and sun avoidance, use daily sunscreen, and perform regular self skin exams.  Sun protection was discussed, including avoiding the mid-day sun, wearing a sunscreen with SPF at least 50, and stressing the need for  reapplication of sunscreen and applying more than they think they need.

## 2024-06-04 ENCOUNTER — LAB (OUTPATIENT)
Dept: LAB | Facility: LAB | Age: 64
End: 2024-06-04
Payer: COMMERCIAL

## 2024-06-04 ENCOUNTER — OFFICE VISIT (OUTPATIENT)
Dept: DERMATOLOGY | Facility: CLINIC | Age: 64
End: 2024-06-04
Payer: COMMERCIAL

## 2024-06-04 DIAGNOSIS — C86.6 LYMPHOMATOID PAPULOSIS (MULTI): ICD-10-CM

## 2024-06-04 DIAGNOSIS — C86.6 LYMPHOMATOID PAPULOSIS (MULTI): Primary | ICD-10-CM

## 2024-06-04 DIAGNOSIS — L57.8 DIFFUSE PHOTODAMAGE OF SKIN: ICD-10-CM

## 2024-06-04 LAB
ALBUMIN SERPL BCP-MCNC: 4.2 G/DL (ref 3.4–5)
ALP SERPL-CCNC: 91 U/L (ref 33–136)
ALT SERPL W P-5'-P-CCNC: 13 U/L (ref 7–45)
ANION GAP SERPL CALC-SCNC: 15 MMOL/L (ref 10–20)
AST SERPL W P-5'-P-CCNC: 13 U/L (ref 9–39)
BASOPHILS # BLD AUTO: 0.07 X10*3/UL (ref 0–0.1)
BASOPHILS NFR BLD AUTO: 0.6 %
BILIRUB SERPL-MCNC: 0.4 MG/DL (ref 0–1.2)
BUN SERPL-MCNC: 8 MG/DL (ref 6–23)
CALCIUM SERPL-MCNC: 9.3 MG/DL (ref 8.6–10.6)
CHLORIDE SERPL-SCNC: 103 MMOL/L (ref 98–107)
CO2 SERPL-SCNC: 26 MMOL/L (ref 21–32)
CREAT SERPL-MCNC: 0.68 MG/DL (ref 0.5–1.05)
EGFRCR SERPLBLD CKD-EPI 2021: >90 ML/MIN/1.73M*2
EOSINOPHIL # BLD AUTO: 0.08 X10*3/UL (ref 0–0.7)
EOSINOPHIL NFR BLD AUTO: 0.7 %
ERYTHROCYTE [DISTWIDTH] IN BLOOD BY AUTOMATED COUNT: 14.3 % (ref 11.5–14.5)
GLUCOSE SERPL-MCNC: 102 MG/DL (ref 74–99)
HCT VFR BLD AUTO: 46 % (ref 36–46)
HGB BLD-MCNC: 15.2 G/DL (ref 12–16)
IMM GRANULOCYTES # BLD AUTO: 0.07 X10*3/UL (ref 0–0.7)
IMM GRANULOCYTES NFR BLD AUTO: 0.6 % (ref 0–0.9)
LYMPHOCYTES # BLD AUTO: 2.09 X10*3/UL (ref 1.2–4.8)
LYMPHOCYTES NFR BLD AUTO: 18.2 %
MCH RBC QN AUTO: 30.8 PG (ref 26–34)
MCHC RBC AUTO-ENTMCNC: 33 G/DL (ref 32–36)
MCV RBC AUTO: 93 FL (ref 80–100)
MONOCYTES # BLD AUTO: 0.89 X10*3/UL (ref 0.1–1)
MONOCYTES NFR BLD AUTO: 7.8 %
NEUTROPHILS # BLD AUTO: 8.26 X10*3/UL (ref 1.2–7.7)
NEUTROPHILS NFR BLD AUTO: 72.1 %
NRBC BLD-RTO: 0 /100 WBCS (ref 0–0)
PLATELET # BLD AUTO: 206 X10*3/UL (ref 150–450)
POTASSIUM SERPL-SCNC: 3.9 MMOL/L (ref 3.5–5.3)
PROT SERPL-MCNC: 7 G/DL (ref 6.4–8.2)
RBC # BLD AUTO: 4.93 X10*6/UL (ref 4–5.2)
SODIUM SERPL-SCNC: 140 MMOL/L (ref 136–145)
WBC # BLD AUTO: 11.5 X10*3/UL (ref 4.4–11.3)

## 2024-06-04 PROCEDURE — 99214 OFFICE O/P EST MOD 30 MIN: CPT | Performed by: DERMATOLOGY

## 2024-06-04 PROCEDURE — 80053 COMPREHEN METABOLIC PANEL: CPT

## 2024-06-04 PROCEDURE — 85025 COMPLETE CBC W/AUTO DIFF WBC: CPT

## 2024-06-04 PROCEDURE — 36415 COLL VENOUS BLD VENIPUNCTURE: CPT

## 2024-06-04 ASSESSMENT — DERMATOLOGY PATIENT ASSESSMENT
WHERE ARE THESE NEW OR CHANGING LESIONS LOCATED: RASH
DO YOU USE SUNSCREEN: OCCASIONALLY
DO YOU HAVE ANY NEW OR CHANGING LESIONS: YES
DO YOU USE A TANNING BED: NO
ARE YOU ON BIRTH CONTROL: NO
ARE YOU TRYING TO GET PREGNANT: NO

## 2024-06-04 ASSESSMENT — DERMATOLOGY QUALITY OF LIFE (QOL) ASSESSMENT
ARE THERE EXCLUSIONS OR EXCEPTIONS FOR THE QUALITY OF LIFE ASSESSMENT: NO
RATE HOW EMOTIONALLY BOTHERED YOU ARE BY YOUR SKIN PROBLEM (FOR EXAMPLE, WORRY, EMBARRASSMENT, FRUSTRATION): 0 - NEVER BOTHERED
RATE HOW BOTHERED YOU ARE BY SYMPTOMS OF YOUR SKIN PROBLEM (EG, ITCHING, STINGING BURNING, HURTING OR SKIN IRRITATION): 0 - NEVER BOTHERED

## 2024-06-04 ASSESSMENT — PATIENT GLOBAL ASSESSMENT (PGA): PATIENT GLOBAL ASSESSMENT: PATIENT GLOBAL ASSESSMENT:  1 - CLEAR

## 2024-06-04 ASSESSMENT — ITCH NUMERIC RATING SCALE: HOW SEVERE IS YOUR ITCHING?: 0

## 2024-06-07 ENCOUNTER — TELEPHONE (OUTPATIENT)
Dept: DERMATOLOGY | Facility: CLINIC | Age: 64
End: 2024-06-07
Payer: COMMERCIAL

## 2024-06-07 NOTE — TELEPHONE ENCOUNTER
[facility] left  pt recently seen , needs rfs of methrotrexate and folic acid  sent to pharmacy ,,

## 2024-06-13 ENCOUNTER — TELEPHONE (OUTPATIENT)
Dept: DERMATOLOGY | Facility: CLINIC | Age: 64
End: 2024-06-13
Payer: COMMERCIAL

## 2024-06-13 NOTE — TELEPHONE ENCOUNTER
Patricia a  for pt , left  , wanting to know if pt needs labs done again in 3 months or when she follows up with you in 6 months , pt just received 3 month supply of methotrexate .. I did return call to Patricia to let her know pt is to have labs drawn every 3 months , however she made her follow up for 6 months .Her  is trying to keep pt on schedule ,

## 2024-06-14 ENCOUNTER — HOSPITAL ENCOUNTER (OUTPATIENT)
Dept: RADIOLOGY | Facility: HOSPITAL | Age: 64
Discharge: HOME | End: 2024-06-14
Payer: COMMERCIAL

## 2024-06-14 DIAGNOSIS — Z92.21 HISTORY OF METHOTREXATE THERAPY: ICD-10-CM

## 2024-06-14 DIAGNOSIS — K74.00 LIVER FIBROSIS: ICD-10-CM

## 2024-06-14 PROCEDURE — 76705 ECHO EXAM OF ABDOMEN: CPT

## 2024-06-21 ENCOUNTER — TELEPHONE (OUTPATIENT)
Dept: DERMATOLOGY | Facility: CLINIC | Age: 64
End: 2024-06-21
Payer: COMMERCIAL

## 2024-06-21 NOTE — TELEPHONE ENCOUNTER
Pt needs follow up appt with Dr Redman for renewal of meds and labs in Sept 2024 at [facility] ..

## 2024-07-02 ENCOUNTER — APPOINTMENT (OUTPATIENT)
Dept: DERMATOLOGY | Facility: CLINIC | Age: 64
End: 2024-07-02
Payer: COMMERCIAL

## 2024-07-16 ENCOUNTER — APPOINTMENT (OUTPATIENT)
Dept: ORTHOPEDIC SURGERY | Facility: CLINIC | Age: 64
End: 2024-07-16
Payer: COMMERCIAL

## 2024-07-17 ENCOUNTER — CLINICAL SUPPORT (OUTPATIENT)
Dept: GASTROENTEROLOGY | Facility: CLINIC | Age: 64
End: 2024-07-17
Payer: COMMERCIAL

## 2024-07-17 DIAGNOSIS — Z92.21 HISTORY OF METHOTREXATE THERAPY: ICD-10-CM

## 2024-07-17 DIAGNOSIS — K74.00 LIVER FIBROSIS: ICD-10-CM

## 2024-07-17 PROCEDURE — 91200 LIVER ELASTOGRAPHY: CPT | Performed by: STUDENT IN AN ORGANIZED HEALTH CARE EDUCATION/TRAINING PROGRAM

## 2024-08-02 ENCOUNTER — TELEPHONE (OUTPATIENT)
Dept: GASTROENTEROLOGY | Facility: HOSPITAL | Age: 64
End: 2024-08-02
Payer: COMMERCIAL

## 2024-08-02 NOTE — TELEPHONE ENCOUNTER
Hepatology Nurse Coordinator Note - ADDENDUM  8/2/2024 9:06 AM  Called patient to review their most recent results and recommendations from Dr. Quintana. Provided education on Fibroscan scoring. Patient is aware she has S3, severe steatosis in the liver. She is aware she has F0-1, mild elevation of liver stiffness.     Reviewed the following recommendations with patient from Dr. Quintana:   -Get blood work completed that was due in July.    Patient verbalized understanding of results and recommendations.      ----- Message from Nawaf Quintana sent at 7/31/2024  8:39 AM EDT -----  Regarding: FibroSCAN results  Please advise pt that her fibroscan estimate normal to mild elevation of liver stiffness- F0-1, and notes severe steatosis. Recommend pt have labs done which were due in July. Can retime them if needed. Thanks  ----- Message -----  From: Nawaf Quintana MD  Sent: 7/17/2024   3:29 PM EDT  To: Nawaf Quintana MD    F0-1. S3.   Recommend pt have labs done which were due this month.

## 2024-09-04 ENCOUNTER — TELEPHONE (OUTPATIENT)
Dept: DERMATOLOGY | Facility: CLINIC | Age: 64
End: 2024-09-04
Payer: COMMERCIAL

## 2024-09-04 NOTE — TELEPHONE ENCOUNTER
Pt left message that she needs refill for her mtx and folic acid she mentioned she just saw Dr Redman on July 2 ,2024 ..please send to [facility] pharmacy .pt  needs rf by Friday ..
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
## 2024-10-01 ENCOUNTER — APPOINTMENT (OUTPATIENT)
Dept: GASTROENTEROLOGY | Facility: CLINIC | Age: 64
End: 2024-10-01
Payer: COMMERCIAL

## 2024-11-14 ENCOUNTER — TELEPHONE (OUTPATIENT)
Dept: DERMATOLOGY | Facility: CLINIC | Age: 64
End: 2024-11-14
Payer: COMMERCIAL

## 2024-11-14 NOTE — TELEPHONE ENCOUNTER
Pt needs refill of methotrexate 2.5mg tab and folic acid refill until her upcoming appt on 12/04/24 .. Called into [facility] , called in prescription metotrexate 2.5 mg pt takes 6 a week dispense 24 and 1 month supply of folic acid pt takes every day except on the days of metotrexate ..

## 2024-12-04 ENCOUNTER — APPOINTMENT (OUTPATIENT)
Dept: DERMATOLOGY | Facility: CLINIC | Age: 64
End: 2024-12-04
Payer: COMMERCIAL

## 2024-12-10 ENCOUNTER — TELEPHONE (OUTPATIENT)
Dept: DERMATOLOGY | Facility: CLINIC | Age: 64
End: 2024-12-10
Payer: COMMERCIAL

## 2024-12-10 NOTE — TELEPHONE ENCOUNTER
Pt needs follow up appt in Dec if possible , she will be in need of rf of her medication and needs labs done as well .. Thank You     Is there any way pt can get in sooner , she will run out of her medication ? She is suppose to follow up every 3-6 months with Dr Redman for refills and labs ..

## 2024-12-17 ENCOUNTER — APPOINTMENT (OUTPATIENT)
Dept: GASTROENTEROLOGY | Facility: CLINIC | Age: 64
End: 2024-12-17
Payer: COMMERCIAL

## 2024-12-19 ENCOUNTER — TELEPHONE (OUTPATIENT)
Dept: DERMATOLOGY | Facility: CLINIC | Age: 64
End: 2024-12-19
Payer: COMMERCIAL

## 2024-12-19 NOTE — TELEPHONE ENCOUNTER
Pt left message she will be needing refill of methotrexate and folic acid , sent to [facility] ... Pt has enough medication until next Friday , her appt is not until Feb 2025 , trying to get [facility]

## 2025-01-13 ENCOUNTER — APPOINTMENT (OUTPATIENT)
Dept: GASTROENTEROLOGY | Facility: CLINIC | Age: 65
End: 2025-01-13
Payer: COMMERCIAL

## 2025-01-17 ENCOUNTER — APPOINTMENT (OUTPATIENT)
Dept: TRANSPLANT | Facility: HOSPITAL | Age: 65
End: 2025-01-17
Payer: COMMERCIAL

## 2025-02-05 ENCOUNTER — APPOINTMENT (OUTPATIENT)
Dept: DERMATOLOGY | Facility: CLINIC | Age: 65
End: 2025-02-05
Payer: COMMERCIAL

## 2025-02-12 ENCOUNTER — APPOINTMENT (OUTPATIENT)
Dept: DERMATOLOGY | Facility: CLINIC | Age: 65
End: 2025-02-12
Payer: COMMERCIAL

## 2025-02-12 DIAGNOSIS — L57.8 DIFFUSE PHOTODAMAGE OF SKIN: ICD-10-CM

## 2025-02-12 DIAGNOSIS — C86.60 LYMPHOMATOID PAPULOSIS: Primary | ICD-10-CM

## 2025-02-12 PROCEDURE — 99214 OFFICE O/P EST MOD 30 MIN: CPT | Performed by: DERMATOLOGY

## 2025-02-12 ASSESSMENT — DERMATOLOGY PATIENT ASSESSMENT
DO YOU HAVE IRREGULAR MENSTRUAL CYCLES: NO
DO YOU HAVE ANY NEW OR CHANGING LESIONS: NO
DO YOU USE SUNSCREEN: DAILY
ARE YOU ON BIRTH CONTROL: NO
ARE YOU TRYING TO GET PREGNANT: NO
DO YOU USE A TANNING BED: NO

## 2025-02-12 ASSESSMENT — DERMATOLOGY QUALITY OF LIFE (QOL) ASSESSMENT: ARE THERE EXCLUSIONS OR EXCEPTIONS FOR THE QUALITY OF LIFE ASSESSMENT: NO

## 2025-02-12 NOTE — PROGRESS NOTES
"Subjective     Masha Blood is a 64 y.o. female who presents for the following: Follow-up.  She has been on Methotrexate 15 mg PO once weekly as well as Folic Acid 1 mg PO once daily except the day she takes MTX since her last visit in our office on 6/4/24 with significant improvement and good control until she ran out of Methotrexate 2.5 weeks ago and has been flaring on her right abdomen, right forearm, and left back since.    She denies any flares since her last visit while on Methotrexate, and states she had been tolerating the Methotrexate well without any side effects noted.    She had previously been on Methotrexate 15 mg PO once weekly after her dose was increased from her previous dose of 10 mg to her current dose of 15 mg once weekly at her prior visit on 2/6/19 until she ran out of Methotrexate in early October 2021 and then underwent left ankle surgery and had a \"nervous breakdown,\" but she states her condition had been significantly improved and well-controlled again since resuming systemic therapy with Methotrexate following her visit on 8/16/22.    Of note, she had her most recent CBC with differential and CMP drawn on 6/4/24, which were essentially within normal limits except elevated WBC 11.5 and slightly elevated glucose 102.    She also had a consultation visit with Dr. Paul James in hepatology on 6/17/20, who recommended she have a Fibroscan ultrasound performed to evaluate for liver fibrosis, which was not completed due to limitations during the COVID-19 pandemic, and she also states she had been hospitalized several times for her psychiatric conditions, including bipolar disorder, depression, and anxiety.  She recently had a FibroScan performed on 7/17/24, which estimated a fibrosis stage of 0-1 and steatosis grade of 3, and her Hepatologist recommended obtaining screening Hepatitis B, C, and A serologies, LFTs, and CBC, but she has not yet had the hepatitis panel drawn and has not had a " "follow-up appointment with Hepatology yet.     She had previously been on Methotrexate 15 mg PO once weekly after her dose was increased from her previous dose of 10 mg to her current dose of 15 mg once weekly at her prior visit on 2/6/19 until she ran out of Methotrexate in early October 2021 and then underwent left ankle surgery and had a \"nervous breakdown,\" but she states her condition had been significantly improved and well-controlled again since resuming systemic therapy with Methotrexate following her visit on 8/16/22.      Review of Systems:  No other skin or systemic complaints other than what is documented elsewhere in the note.    The following portions of the chart were reviewed this encounter and updated as appropriate:       Skin Cancer History  No skin cancer on file.    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  - no history of eczema, psoriasis, skin cancer, leukemia, or lymphoma     Family History:    No family history of eczema, psoriasis, leukemia, or lymphoma     Social History:    The patient used to live temporarily at a hotel in [place] and then started a new job as a  at [employer] and moved into an apartment in [place]; she states she underwent left ankle surgery in February 2022 and then had a \"nervous breakdown\" and was living in a homeless shelter, but states she is now back in her own apartment    Allergies:  Bee venom protein (honey bee), Adhesive tape-silicones, " Meperidine, Tramadol, and Gabapentin    Current Medications / CAM's:    Current Outpatient Medications:     amLODIPine (Norvasc) 5 mg tablet, Take 1 tablet (5 mg) by mouth once daily., Disp: , Rfl:     benzoyl peroxide 5 % lotion, Apply 1 Application topically., Disp: , Rfl:     busPIRone (Buspar) 10 mg tablet, Take 1 tablet (10 mg) by mouth twice a day., Disp: , Rfl:     busPIRone (Buspar) 15 mg tablet, , Disp: , Rfl:     clindamycin (Cleocin T) 1 % lotion, Apply 1 Application topically., Disp: , Rfl:     docusate sodium 250 mg capsule, Take 1 capsule (250 mg) by mouth once daily as needed (To be used while narcotics are being taken)., Disp: 20 capsule, Rfl: 0    escitalopram (Lexapro) 10 mg tablet, , Disp: , Rfl:     escitalopram (Lexapro) 20 mg tablet, , Disp: , Rfl:     fluocinonide 0.05 % cream, Apply 1 Application topically., Disp: , Rfl:     folic acid (Folvite) 1 mg tablet, Take 1 tablet (1 mg) by mouth., Disp: , Rfl:     hydrOXYzine pamoate (Vistaril) 25 mg capsule, , Disp: , Rfl:     ibuprofen 600 mg tablet, Take 1 tablet (600 mg) by mouth every 6 hours if needed for mild pain (1 - 3)., Disp: 30 tablet, Rfl: 0    losartan (Cozaar) 100 mg tablet, Take 1 tablet (100 mg) by mouth once daily., Disp: , Rfl:     methotrexate (Trexall) 2.5 mg tablet, , Disp: , Rfl:     oxyCODONE-acetaminophen (Percocet) 5-325 mg tablet, Take 1-2 tablets by mouth every 6 hours if needed for severe pain (7 - 10)., Disp: 15 tablet, Rfl: 0    risperiDONE (RisperDAL) 2 mg tablet, , Disp: , Rfl:     triamcinolone (Kenalog) 0.025 % cream, Apply 1 Application topically., Disp: , Rfl:     Vitamin D2 1,250 mcg (50,000 unit) capsule, , Disp: , Rfl:      Objective   Well appearing patient in no apparent distress; mood and affect are within normal limits.    A full examination was performed including scalp, face, eyes, ears, nose, lips, neck, chest, axillae, abdomen, back, bilateral upper extremities, and bilateral lower extremities. All  findings within normal limits unless otherwise noted below.    Assessment/Plan   1. Lymphomatoid papulosis  On her right abdomen, right forearm, and left back, there are several grouped erythematous papules, some with a central crust, grouped in each location.  Scattered on her trunk and bilateral upper extremities, there are several similar-appearing pink to hyperpigmented macules consistent with postinflammatory hyperpigmentation.    Right Abdomen (side) - Upper      Lymphomatoid Papulosis - previously significantly improved and well-controlled since increasing her dose of Methotrexate on 2/6/19 and since re-starting MTX in August 2016, but then with progressively worsening flare since self-discontinuation in early October 2021 and now significantly improved and well-controlled again since resuming systemic therapy with Methotrexate following her visit on 8/16/22.  The potentially chronic and intermittently flaring nature of this condition and treatment options were discussed extensively with the patient again today.  We again spent a great deal of time discussing various treatment options, including topical therapy, UV phototherapy, and systemic therapy, such as with Methotrexate, which she restarted in August 2016 after achieving significant improvement and good control while first on the medication for several months.  After discussing the risks, benefits, and side effects of each of these options at length, including hepatotoxicity and multiple additional potential side effects associated with Methotrexate, the patient expressed understanding and wishes to resume her systemic therapy with her most recent increased dose of Methotrexate 15 mg PO once weekly.    Of note, she had her most recent set of routine monitoring labs, including CBC w/ Diff and CMP, drawn on 6/4/24, which were within acceptable limits, and she states she plans to have routine monitoring labs drawn again today, which we will follow-up, and,  in addition, she was instructed to have hepatitis B, C, and a serologies drawn as recommended by hepatology.    Given her condition has improved significantly since restarting therapy and she states she has been tolerating the medication well again without any side effects noted, if her routine monitoring labs remain within acceptable limits and her Hepatitis serologies negative and if approved by Hepatology, we recommend she continue her current systemic therapy with Methotrexate 15 mg PO once weekly as well as Folic Acid 1 mg PO once daily except the day she takes MTX, which she takes on Fridays.  She was also instructed to have routine monitoring labs drawn once every 3 months.    In addition, for the new lesions on her right abdomen, right forearm, and left back, we recommend she resume combination topical therapy with Clindamycin 1% lotion and Fluocinonide 0.05% cream, which she was instructed to apply twice daily to the affected lesions on her body (avoid face, groin, body folds) for the next 2 weeks, followed by taper to twice daily on weekends only for persistent lesions; she may repeat treatment in a 2-week burst-and-taper fashion every 6-8 weeks as needed for new lesions or future flares.  The risks, benefits, and side effects of these medications, including possible skin atrophy with overuse of topical steroids, were discussed.    In addition, given her long-term Methotrexate therapy, she was again instructed to follow-up with Hepatology here at , such as Dr. Paul James, who she saw on 6/17/20 for continued evaluation for possible Methotrexate-induced hepatotoxicity.  Of note, she recently had a FibroScan performed on 7/17/24, which estimated a fibrosis stage of 0-1 and steatosis grade of 3.  Thus, at this time, she was again instructed to follow-up with her primary care physician, Dr. Nico Van, to discuss her previously elevated serum glucose level, and she was instructed to return to our office in  2-3 months for re-evaluation and to have repeat monitoring labs drawn again at that time (every 3 months).  She expressed understanding and is inagreement with this plan.    Related Procedures  Hepatitis B Core Antibody, Total  Hepatitis B Surface Antibody  Hepatitis B Surface Antigen  Hepatitis C Antibody  Hepatitis A Antibody, Total  Comprehensive metabolic panel  CBC and Auto Differential    2. Diffuse photodamage of skin  Photodistributed  Diffuse photodamage with actinic changes with telangiectasia and mottled pigmentation in sun-exposed areas.    Photodamage.  The signs and symptoms of skin cancer were reviewed and the patient was advised to practice sun protection and sun avoidance, use daily sunscreen, and perform regular self skin exams.  Sun protection was discussed, including avoiding the mid-day sun, wearing a sunscreen with SPF at least 50, and stressing the need for reapplication of sunscreen and applying more than they think they need.        Colten Rodas DO  PGY-4 Dermatology      I saw and evaluated the patient. I personally obtained the key and critical portions of the history and physical exam or was physically present for key and critical portions performed by the resident/fellow. I reviewed the resident/fellow's documentation and discussed the patient with the resident/fellow. I agree with the resident/fellow's medical decision making as documented in the note.    Jeovany Redman MD

## 2025-02-13 LAB
ALBUMIN SERPL-MCNC: 4.3 G/DL (ref 3.6–5.1)
ALP SERPL-CCNC: 84 U/L (ref 37–153)
ALT SERPL-CCNC: 18 U/L (ref 6–29)
ANION GAP SERPL CALCULATED.4IONS-SCNC: 9 MMOL/L (CALC) (ref 7–17)
AST SERPL-CCNC: 15 U/L (ref 10–35)
BASOPHILS # BLD AUTO: 52 CELLS/UL (ref 0–200)
BASOPHILS NFR BLD AUTO: 0.5 %
BILIRUB SERPL-MCNC: 0.4 MG/DL (ref 0.2–1.2)
BUN SERPL-MCNC: 8 MG/DL (ref 7–25)
CALCIUM SERPL-MCNC: 9.7 MG/DL (ref 8.6–10.4)
CHLORIDE SERPL-SCNC: 105 MMOL/L (ref 98–110)
CO2 SERPL-SCNC: 27 MMOL/L (ref 20–32)
CREAT SERPL-MCNC: 0.77 MG/DL (ref 0.5–1.05)
EGFRCR SERPLBLD CKD-EPI 2021: 87 ML/MIN/1.73M2
EOSINOPHIL # BLD AUTO: 104 CELLS/UL (ref 15–500)
EOSINOPHIL NFR BLD AUTO: 1 %
ERYTHROCYTE [DISTWIDTH] IN BLOOD BY AUTOMATED COUNT: 12.9 % (ref 11–15)
GLUCOSE SERPL-MCNC: 111 MG/DL (ref 65–99)
HAV AB SER QL IA: REACTIVE
HBV CORE AB SERPL QL IA: NORMAL
HBV SURFACE AB SERPL IA-ACNC: <5 MIU/ML
HBV SURFACE AG SERPL QL IA: NORMAL
HCT VFR BLD AUTO: 49.8 % (ref 35–45)
HCV AB SERPL QL IA: NORMAL
HGB BLD-MCNC: 16.1 G/DL (ref 11.7–15.5)
LYMPHOCYTES # BLD AUTO: 2600 CELLS/UL (ref 850–3900)
LYMPHOCYTES NFR BLD AUTO: 25 %
MCH RBC QN AUTO: 30.8 PG (ref 27–33)
MCHC RBC AUTO-ENTMCNC: 32.3 G/DL (ref 32–36)
MCV RBC AUTO: 95.2 FL (ref 80–100)
MONOCYTES # BLD AUTO: 749 CELLS/UL (ref 200–950)
MONOCYTES NFR BLD AUTO: 7.2 %
NEUTROPHILS # BLD AUTO: 6895 CELLS/UL (ref 1500–7800)
NEUTROPHILS NFR BLD AUTO: 66.3 %
PLATELET # BLD AUTO: 245 THOUSAND/UL (ref 140–400)
PMV BLD REES-ECKER: 11.3 FL (ref 7.5–12.5)
POTASSIUM SERPL-SCNC: 4.4 MMOL/L (ref 3.5–5.3)
PROT SERPL-MCNC: 7.6 G/DL (ref 6.1–8.1)
RBC # BLD AUTO: 5.23 MILLION/UL (ref 3.8–5.1)
SODIUM SERPL-SCNC: 141 MMOL/L (ref 135–146)
WBC # BLD AUTO: 10.4 THOUSAND/UL (ref 3.8–10.8)

## 2025-02-17 ENCOUNTER — TELEPHONE (OUTPATIENT)
Dept: DERMATOLOGY | Facility: CLINIC | Age: 65
End: 2025-02-17
Payer: COMMERCIAL

## 2025-02-17 NOTE — TELEPHONE ENCOUNTER
Pt left message to please send refill for her methotrexate and folic acid to [facility] , she had labs done on 02/12/25 ....  PT HAS CALLED TWICE TODAY LEFT MESSAGES NEEDING HER MEDICATION .. SHE HAS BEEN OFF HER MEDICATION FOR A WHILE NOW ..  Spoke with pt on Monday 02/17/25 she said she picked her medication ..  I have received 6-7 messages yesterday afternoon that pt is still waiting for he rfs of methotrexate and folic acid ??? I tried returning call to pt her VM was full unable to leave message , called pharmacy spoke with Alma the pharmacist , he stated that pt has picked up her medication already ,, tried again to call Masha Blood finally spoke with her she was confused because she had a different Dr sending medication over to her pharmacy , Pt has gotten her script for her medcation from Dr Colten Rodas ...

## 2025-02-19 DIAGNOSIS — C86.60 LYMPHOMATOID PAPULOSIS: Primary | ICD-10-CM

## 2025-02-19 RX ORDER — METHOTREXATE 2.5 MG/1
15 TABLET ORAL WEEKLY
Qty: 30 TABLET | Refills: 2 | Status: SHIPPED | OUTPATIENT
Start: 2025-02-19

## 2025-02-19 RX ORDER — FOLIC ACID 1 MG/1
1 TABLET ORAL DAILY
Qty: 30 TABLET | Refills: 11 | Status: SHIPPED | OUTPATIENT
Start: 2025-02-19

## 2025-03-06 DIAGNOSIS — C86.60 LYMPHOMATOID PAPULOSIS: ICD-10-CM

## 2025-03-06 RX ORDER — FOLIC ACID 1 MG/1
TABLET ORAL
Qty: 24 TABLET | Refills: 2 | Status: SHIPPED | OUTPATIENT
Start: 2025-03-06

## 2025-03-06 RX ORDER — METHOTREXATE 2.5 MG/1
TABLET ORAL
Qty: 24 TABLET | Refills: 2 | Status: SHIPPED | OUTPATIENT
Start: 2025-03-06

## 2025-03-19 ENCOUNTER — APPOINTMENT (OUTPATIENT)
Dept: DERMATOLOGY | Facility: CLINIC | Age: 65
End: 2025-03-19
Payer: COMMERCIAL

## 2025-04-01 ENCOUNTER — APPOINTMENT (OUTPATIENT)
Dept: GASTROENTEROLOGY | Facility: CLINIC | Age: 65
End: 2025-04-01
Payer: COMMERCIAL

## 2025-04-07 ENCOUNTER — APPOINTMENT (OUTPATIENT)
Dept: GASTROENTEROLOGY | Facility: CLINIC | Age: 65
End: 2025-04-07
Payer: COMMERCIAL
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
## 2025-04-22 ENCOUNTER — OFFICE VISIT (OUTPATIENT)
Dept: GASTROENTEROLOGY | Facility: CLINIC | Age: 65
End: 2025-04-22
Payer: COMMERCIAL

## 2025-04-22 VITALS
BODY MASS INDEX: 36.14 KG/M2 | DIASTOLIC BLOOD PRESSURE: 68 MMHG | SYSTOLIC BLOOD PRESSURE: 106 MMHG | HEIGHT: 63 IN | HEART RATE: 71 BPM | TEMPERATURE: 96.6 F | WEIGHT: 204 LBS

## 2025-04-22 DIAGNOSIS — K76.0 METABOLIC DYSFUNCTION-ASSOCIATED STEATOTIC LIVER DISEASE (MASLD): Primary | ICD-10-CM

## 2025-04-22 DIAGNOSIS — K59.09 OTHER CONSTIPATION: ICD-10-CM

## 2025-04-22 PROCEDURE — 3008F BODY MASS INDEX DOCD: CPT | Performed by: STUDENT IN AN ORGANIZED HEALTH CARE EDUCATION/TRAINING PROGRAM

## 2025-04-22 PROCEDURE — 99214 OFFICE O/P EST MOD 30 MIN: CPT | Performed by: STUDENT IN AN ORGANIZED HEALTH CARE EDUCATION/TRAINING PROGRAM

## 2025-04-22 RX ORDER — LOVASTATIN 40 MG/1
40 TABLET ORAL
COMMUNITY
Start: 2024-04-01

## 2025-04-22 RX ORDER — BENZTROPINE MESYLATE 0.5 MG/1
0.5 TABLET ORAL 2 TIMES DAILY
COMMUNITY

## 2025-04-22 RX ORDER — CYCLOBENZAPRINE HCL 10 MG
10 TABLET ORAL 3 TIMES DAILY PRN
COMMUNITY
Start: 2025-03-27

## 2025-04-22 RX ORDER — POLYETHYLENE GLYCOL 3350 17 G/17G
17 POWDER, FOR SOLUTION ORAL DAILY
Qty: 30 PACKET | Refills: 0 | Status: SHIPPED | OUTPATIENT
Start: 2025-04-22 | End: 2025-05-22

## 2025-04-22 RX ORDER — PRAZOSIN HYDROCHLORIDE 2 MG/1
CAPSULE ORAL
COMMUNITY
Start: 2025-04-21

## 2025-04-22 RX ORDER — IBUPROFEN 200 MG
1 TABLET ORAL
COMMUNITY
Start: 2024-04-18

## 2025-04-22 RX ORDER — PSYLLIUM SEED
1 PACKET (EA) ORAL 2 TIMES DAILY
Qty: 660 G | Refills: 1 | Status: SHIPPED | OUTPATIENT
Start: 2025-04-22 | End: 2025-06-21

## 2025-04-22 NOTE — PATIENT INSTRUCTIONS
Masha Blood,     Thank you for coming in for your hepatology office visit today. As per our discussion, I recommend:     For monitoring of fatty liver- I recommend check of your blood work every 6-12 months. This can be done with your PCP office. I have placed the next set of labs for Sept 2025.   You can return to my office as needed or in 2-3 years to have intermittent monitoring of fatty liver done.    Work on making changes to your nutrition pattern as we reviewed in the office.   A nutrition consult is being provided as well.  Aim for 5-10% weight loss in the next 6-12 months.   Try use of miralax 17gm daily with 8-10z of water for a few weeks.   Start fiber 1 scoop twice a day after you have some improvement in your Bms with miralax. You can continue use of fiber thereafter and use miralax as needed.       If you have any trouble or need assistance with having this done, please reach out to my 's office at [phone] (Ms Eli Soto) or my nurse coordinator at [phone] (Ms Leonela MARTINEZ).   Office scheduling number is [phone].     I look forward to seeing you again. Thank you for allowing me to participate in your care.     Sincerely,  Nawaf Quintana MD  Hepatology
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
## 2025-06-04 ENCOUNTER — TELEPHONE (OUTPATIENT)
Dept: DERMATOLOGY | Facility: CLINIC | Age: 65
End: 2025-06-04
Payer: COMMERCIAL

## 2025-06-04 DIAGNOSIS — C86.60 LYMPHOMATOID PAPULOSIS: ICD-10-CM

## 2025-06-04 RX ORDER — METHOTREXATE 2.5 MG/1
15 TABLET ORAL WEEKLY
Qty: 24 TABLET | Refills: 0 | Status: SHIPPED | OUTPATIENT
Start: 2025-06-04 | End: 2025-07-04

## 2025-06-04 RX ORDER — FOLIC ACID 1 MG/1
1 TABLET ORAL DAILY
Qty: 30 TABLET | Refills: 0 | Status: SHIPPED | OUTPATIENT
Start: 2025-06-04 | End: 2025-07-04

## 2025-06-04 NOTE — TELEPHONE ENCOUNTER
PATIENT CALLED IN A REQUEST FOR A REFILL OF METHOTREXATE AND FOLIC ACID. SEND TO [facility]. NEXT VISIT IS 6/18/25

## 2025-06-06 ENCOUNTER — TELEPHONE (OUTPATIENT)
Dept: DERMATOLOGY | Facility: CLINIC | Age: 65
End: 2025-06-06
Payer: COMMERCIAL

## 2025-06-06 NOTE — TELEPHONE ENCOUNTER
Pt has left 6 VM about her refill which has been taken care and her labs ?? Is it ok for pt to have her labs drawn on the day of her visit --if not there needs be an order in her chart , and I will need to call her and let her know ..

## 2025-06-14 LAB
ALBUMIN SERPL-MCNC: 4.2 G/DL (ref 3.6–5.1)
ALP SERPL-CCNC: 87 U/L (ref 37–153)
ALT SERPL-CCNC: 21 U/L (ref 6–29)
ANION GAP SERPL CALCULATED.4IONS-SCNC: 6 MMOL/L (CALC) (ref 7–17)
AST SERPL-CCNC: 16 U/L (ref 10–35)
BASOPHILS # BLD AUTO: 31 CELLS/UL (ref 0–200)
BASOPHILS NFR BLD AUTO: 0.4 %
BILIRUB SERPL-MCNC: 0.4 MG/DL (ref 0.2–1.2)
BUN SERPL-MCNC: 12 MG/DL (ref 7–25)
CALCIUM SERPL-MCNC: 9.4 MG/DL (ref 8.6–10.4)
CHLORIDE SERPL-SCNC: 106 MMOL/L (ref 98–110)
CO2 SERPL-SCNC: 26 MMOL/L (ref 20–32)
CREAT SERPL-MCNC: 0.73 MG/DL (ref 0.5–1.05)
EGFRCR SERPLBLD CKD-EPI 2021: 92 ML/MIN/1.73M2
EOSINOPHIL # BLD AUTO: 133 CELLS/UL (ref 15–500)
EOSINOPHIL NFR BLD AUTO: 1.7 %
ERYTHROCYTE [DISTWIDTH] IN BLOOD BY AUTOMATED COUNT: 14.4 % (ref 11–15)
GLUCOSE SERPL-MCNC: 153 MG/DL (ref 65–139)
HCT VFR BLD AUTO: 47.5 % (ref 35–45)
HGB BLD-MCNC: 16 G/DL (ref 11.7–15.5)
LYMPHOCYTES # BLD AUTO: 1864 CELLS/UL (ref 850–3900)
LYMPHOCYTES NFR BLD AUTO: 23.9 %
MCH RBC QN AUTO: 31.7 PG (ref 27–33)
MCHC RBC AUTO-ENTMCNC: 33.7 G/DL (ref 32–36)
MCV RBC AUTO: 94.2 FL (ref 80–100)
MONOCYTES # BLD AUTO: 499 CELLS/UL (ref 200–950)
MONOCYTES NFR BLD AUTO: 6.4 %
NEUTROPHILS # BLD AUTO: 5273 CELLS/UL (ref 1500–7800)
NEUTROPHILS NFR BLD AUTO: 67.6 %
PLATELET # BLD AUTO: 201 THOUSAND/UL (ref 140–400)
PMV BLD REES-ECKER: 11.1 FL (ref 7.5–12.5)
POTASSIUM SERPL-SCNC: 4.2 MMOL/L (ref 3.5–5.3)
PROT SERPL-MCNC: 7 G/DL (ref 6.1–8.1)
RBC # BLD AUTO: 5.04 MILLION/UL (ref 3.8–5.1)
SODIUM SERPL-SCNC: 138 MMOL/L (ref 135–146)
WBC # BLD AUTO: 7.8 THOUSAND/UL (ref 3.8–10.8)

## 2025-06-18 ENCOUNTER — APPOINTMENT (OUTPATIENT)
Dept: DERMATOLOGY | Facility: CLINIC | Age: 65
End: 2025-06-18
Payer: COMMERCIAL

## 2025-06-18 DIAGNOSIS — C86.60 LYMPHOMATOID PAPULOSIS: Primary | ICD-10-CM

## 2025-06-18 DIAGNOSIS — L57.0 ACTINIC KERATOSIS: ICD-10-CM

## 2025-06-18 DIAGNOSIS — L57.8 DIFFUSE PHOTODAMAGE OF SKIN: ICD-10-CM

## 2025-06-18 DIAGNOSIS — Z12.83 ENCOUNTER FOR SCREENING FOR MALIGNANT NEOPLASM OF SKIN: ICD-10-CM

## 2025-06-18 DIAGNOSIS — D18.01 HEMANGIOMA OF SKIN: ICD-10-CM

## 2025-06-18 DIAGNOSIS — L21.9 SEBORRHEIC DERMATITIS: ICD-10-CM

## 2025-06-18 DIAGNOSIS — D22.9 MELANOCYTIC NEVUS, UNSPECIFIED LOCATION: ICD-10-CM

## 2025-06-18 DIAGNOSIS — Z79.899 LONG TERM USE OF DRUG: ICD-10-CM

## 2025-06-18 DIAGNOSIS — D48.5 NEOPLASM OF UNCERTAIN BEHAVIOR OF SKIN: ICD-10-CM

## 2025-06-18 DIAGNOSIS — L82.1 SEBORRHEIC KERATOSIS: ICD-10-CM

## 2025-06-18 PROCEDURE — 17003 DESTRUCT PREMALG LES 2-14: CPT | Performed by: DERMATOLOGY

## 2025-06-18 PROCEDURE — 17000 DESTRUCT PREMALG LESION: CPT | Performed by: DERMATOLOGY

## 2025-06-18 PROCEDURE — 99214 OFFICE O/P EST MOD 30 MIN: CPT | Performed by: DERMATOLOGY

## 2025-06-18 PROCEDURE — 11302 SHAVE SKIN LESION 1.1-2.0 CM: CPT | Performed by: DERMATOLOGY

## 2025-06-18 RX ORDER — FOLIC ACID 1 MG/1
1 TABLET ORAL DAILY
Qty: 30 TABLET | Refills: 0 | Status: SHIPPED | OUTPATIENT
Start: 2025-06-18 | End: 2025-07-18

## 2025-06-18 RX ORDER — KETOCONAZOLE 20 MG/ML
SHAMPOO, SUSPENSION TOPICAL
Qty: 120 ML | Refills: 11 | Status: SHIPPED | OUTPATIENT
Start: 2025-06-18

## 2025-06-18 RX ORDER — METHOTREXATE 2.5 MG/1
15 TABLET ORAL WEEKLY
Qty: 24 TABLET | Refills: 2 | Status: SHIPPED | OUTPATIENT
Start: 2025-06-18 | End: 2025-09-16

## 2025-06-18 NOTE — Clinical Note
1.4 cm dark brown pigmented, asymmetric, thin plaque with an asymmetric pigment network and irregular borders

## 2025-06-18 NOTE — Clinical Note
Hurley Angiomas - the benign nature of these vascular lesions was discussed with the patient today and reassurance provided.  No treatment is medically indicated for these lesions at this time.

## 2025-06-18 NOTE — PROGRESS NOTES
"Subjective     Masha Blood is a 65 y.o. female who presents for the following: Skin Check.  She has been on Methotrexate 15 mg PO once weekly as well as Folic Acid 1 mg PO once daily except the day she takes MTX since her last visit in our office on 2/12/25 with significant improvement and good control.  She denies any flares since her last visit, and states she has been tolerating the Methotrexate well without any side effects noted.    Of note, she had her most recent CBC with differential and CMP drawn on 6/13/25, which were essentially within normal limits except slightly elevated hemoglobin 16.0 and elevated glucose 153.    She also had a consultation visit with Dr. Paul James in hepatology on 6/17/20, who recommended she have a Fibroscan ultrasound performed to evaluate for liver fibrosis, which was not completed due to limitations during the COVID-19 pandemic, and she also states she had been hospitalized several times for her psychiatric conditions, including bipolar disorder, depression, and anxiety.  She recently had a FibroScan performed on 7/17/24, which estimated a fibrosis stage of 0-1 and steatosis grade of 3, and her Hepatologist recommended obtaining screening Hepatitis B, C, and A serologies, LFTs, and CBC, but she has not yet had the hepatitis panel drawn and has not had a follow-up appointment with Hepatology yet.     She had previously been on Methotrexate 15 mg PO once weekly after her dose was increased from her previous dose of 10 mg to her current dose of 15 mg once weekly at her prior visit on 2/6/19 until she ran out of Methotrexate in early October 2021 and then underwent left ankle surgery and had a \"nervous breakdown,\" but she states her condition has been significantly improved and well-controlled again since resuming systemic therapy with Methotrexate following her visit on 8/16/22.    Today, she notes a spot on the right side of her back, which she states has changed shape " "and color recently and bled 2 weeks ago.  She denies any other new, changing, or concerning skin lesions since her last visit; no bleeding, itching, or burning lesions.      Review of Systems:  No other skin or systemic complaints other than what is documented elsewhere in the note.    The following portions of the chart were reviewed this encounter and updated as appropriate:       Skin Cancer History  Biopsy Log Book  No skin cancers from Specimen Tracking.    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  - no history of eczema, psoriasis, skin cancer, leukemia, or lymphoma     Family History:    No family history of eczema, psoriasis, leukemia, or lymphoma     Social History:    The patient used to live temporarily at a hotel in [place] and then started a new job as a  at [employer] and moved into an apartment in [place]; she states she underwent left ankle surgery in February 2022 and then had a \"nervous breakdown\" and was living in a homeless shelter, but states she is now back in her own apartment    Allergies:  Bee venom protein (honey bee), Adhesive tape-silicones, Meperidine, Tramadol, and Gabapentin    Current Medications / CAM's:  Current Medications[1]     Objective   Well appearing patient in no apparent distress; mood and affect are within normal limits.    A full examination was performed including scalp, face, eyes, ears, nose, lips, neck, chest, axillae, abdomen, " back, bilateral upper extremities, and bilateral lower extremities. All findings within normal limits unless otherwise noted below.    Assessment/Plan   Skin Exam  1. LYMPHOMATOID PAPULOSIS  Right Abdomen (side) - Upper  Scattered on her trunk and extremities, there are several similar-appearing pink to hyperpigmented macules consistent with postinflammatory hyperpigmentation, but no inflammatory papules or lesions suspicious for lymphomatoid papulosis on exam today  Lymphomatoid Papulosis - previously significantly improved and well-controlled since increasing her dose of Methotrexate on 2/6/19 and since re-starting MTX in August 2016, but then with progressively worsening flare since self-discontinuation in early October 2021 and now significantly improved and well-controlled again since resuming systemic therapy with Methotrexate following her visit on 8/16/22.  The potentially chronic and intermittently flaring nature of this condition and treatment options were discussed extensively with the patient again today.  We again spent a great deal of time discussing various treatment options, including topical therapy, UV phototherapy, and systemic therapy, such as with Methotrexate, which she restarted in August 2016 after achieving significant improvement and good control while first on the medication for several months.  After discussing the risks, benefits, and side effects of each of these options at length, including hepatotoxicity and multiple additional potential side effects associated with Methotrexate, the patient expressed understanding and wishes to continue her systemic therapy with her most recent increased dose of Methotrexate 15 mg PO once weekly.     Of note, she had her most recent set of routine monitoring labs, including CBC w/ Diff and CMP, drawn on 6/13/25, which were within acceptable limits.  Thus, given her condition has improved significantly since restarting therapy, she states she has been  tolerating the medication well again without any side effects noted, and her routine monitoring labs remain within acceptable limits and her Hepatitis serologies negative and as approved by Hepatology, we recommend she continue her current systemic therapy with Methotrexate 15 mg PO once weekly as well as Folic Acid 1 mg PO once daily except the day she takes MTX, which she takes on Fridays.  She was also instructed to have routine monitoring labs drawn once every 3 months.     In addition, for new lesions or future flares, we recommend she resume combination topical therapy with Clindamycin 1% lotion and Fluocinonide 0.05% cream, which she was instructed to apply twice daily to the affected lesions on her body (avoid face, groin, body folds) for the next 2 weeks, followed by taper to twice daily on weekends only for persistent lesions; she may repeat treatment in a 2-week burst-and-taper fashion every 6-8 weeks as needed for new lesions or future flares.  The risks, benefits, and side effects of these medications, including possible skin atrophy with overuse of topical steroids, were discussed.     In addition, given her long-term Methotrexate therapy, she was again instructed to follow-up with Hepatology here at , such as Dr. Paul James, who she saw on 6/17/20 for continued evaluation for possible Methotrexate-induced hepatotoxicity.  Of note, she recently had a FibroScan performed on 7/17/24, which estimated a fibrosis stage of 0-1 and steatosis grade of 3.  She currently followed steatotic liver disease by Dr. Quintana in Hepatology and is currently being monitored with serial LFTs and CBCs with plan for repeat fibroscans and surveillance ultrasounds approximately every 3 years.  She was also again instructed to follow-up with her primary care physician, Dr. Jazmyn Lindsey, to discuss her elevated serum glucose level, and she was instructed to return to our office in 2-3 months for re-evaluation and to have  repeat monitoring labs drawn again at that time (every 3 months).  She expressed understanding and is inagreement with this plan.  Related Medications  folic acid (Folvite) 1 mg tablet  Take 1 tablet (1 mg) by mouth once daily. Except on days you take methotrexate.  methotrexate (Trexall) 2.5 mg tablet  Take 6 tablets (15 mg total) by mouth 1 (one) time per week.  Follow directions carefully, and ask to explain any part you do not understand. Take exactly as directed.Take 6 tablets 1 time per week.  2. NEOPLASM OF UNCERTAIN BEHAVIOR OF SKIN (2)  right lateral mid back  1.4 cm dark brown pigmented, asymmetric, thin plaque with an asymmetric pigment network and irregular borders     Shave removal    Lesion length (cm):  1.4  Lesion width (cm):  1.4  Margin per side (cm):  0.2  Lesion diameter (cm):  1.8  Informed consent: discussed and consent obtained    Timeout: patient name, date of birth, surgical site, and procedure verified    Procedure prep:  Patient was prepped and draped  Anesthesia: the lesion was anesthetized in a standard fashion    Anesthetic:  1% lidocaine w/ epinephrine 1-100,000 local infiltration  Instrument used: flexible razor blade    Hemostasis achieved with: aluminum chloride    Outcome: patient tolerated procedure well    Post-procedure details: sterile dressing applied and wound care instructions given    Dressing type: bandage and petrolatum    Specimen 1 - Dermatopathology- DERM LAB  Differential Diagnosis: isk v dysplastic nevus  Check Margins Yes/No?:    Comments:    Dermpath Lab: Routine Histopathology (formalin-fixed tissue)  left lateral mid back  7 mm dark brown pigmented, asymmetric macule with an asymmetric pigment network and irregular borders     Shave removal    Lesion length (cm):  0.7  Lesion width (cm):  0.7  Margin per side (cm):  0.2  Lesion diameter (cm):  1.1  Informed consent: discussed and consent obtained    Timeout: patient name, date of birth, surgical site, and  procedure verified    Procedure prep:  Patient was prepped and draped  Anesthesia: the lesion was anesthetized in a standard fashion    Anesthetic:  1% lidocaine w/ epinephrine 1-100,000 local infiltration  Instrument used: flexible razor blade    Hemostasis achieved with: aluminum chloride    Outcome: patient tolerated procedure well    Post-procedure details: sterile dressing applied and wound care instructions given    Dressing type: bandage and petrolatum    Specimen 2 - Dermatopathology- DERM LAB  Differential Diagnosis: r/o dysplastic nevus v sk  Check Margins Yes/No?:    Comments:    Dermpath Lab: Routine Histopathology (formalin-fixed tissue)  3. ACTINIC KERATOSIS (2)  Head - Anterior (Face) (2)  Scattered on the patient's left lateral upper cheek, there are 2 erythematous, gritty, scaly macules   Actinic Keratoses - scattered on left lateral upper cheek.  The pre-cancerous nature of these lesions and treatment options were discussed with the patient today.  At this time, we recommend treatment with liquid nitrogen cryotherapy.  The patient expressed understanding, is in agreement with this plan, and wishes to proceed with cryotherapy today.  Destr of lesion - Head - Anterior (Face) (2)  Complexity: simple    Destruction method: cryotherapy    Informed consent: discussed and consent obtained    Lesion destroyed using liquid nitrogen: Yes    Cryotherapy cycles:  1  Outcome: patient tolerated procedure well with no complications    Post-procedure details: wound care instructions given    4. SEBORRHEIC DERMATITIS  Head - Anterior (Face)  On the patient's scalp, there are pink, scaly patches with whitish-yellowish, greasy scale  Seborrheic Dermatitis - scalp.  The potentially chronic and intermittently flaring nature of this condition and treatment options were discussed extensively with the patient today.  At this time, we recommend topical anti-fungal therapy with Ketoconazole 2% shampoo, which the patient was  instructed to use 2-3 days per week, alternating with over-the-counter anti-dandruff shampoos, such as Head & Shoulders, Selsun Blue, and Neutrogena T-gel, every month.  The risks, benefits, and side effects of this medication were discussed.  The patient expressed understanding and is in agreement with this plan.  ketoconazole (NIZOral) 2 % shampoo - Head - Anterior (Face)  Wash affected areas of scalp 2-3 times weekly as directed  5. MELANOCYTIC NEVUS, UNSPECIFIED LOCATION  Generalized  Scattered on the patient's face, neck, trunk, and extremities, there are multiple small, round- to oval-shaped, brown-pigmented and pink-colored, symmetric, uniform-appearing macules and dome-shaped papules  Clinically benign- to slightly atypical-appearing nevi - the clinically benign- to slightly atypical-appearing nature of the remainder of the patient's nevi was discussed with the patient today.  None of the patient's nevi, with the exception of the one noted above, meet threshold for biopsy today.  We emphasized the importance of performing monthly self-skin exams using the ABCDs of monitoring moles, which were reviewed with the patient today and an informational hand-out provided.  We also emphasized the importance of sun avoidance and sun protection with daily sunscreen use.  6. SEBORRHEIC KERATOSIS  Generalized  Scattered on the patient's face, neck, trunk, and extremities, there are multiple tan- to light brown-colored, hyperkeratotic, stuck-on appearing papules of varying size and shape  Seborrheic Keratoses - the benign nature of these lesions was discussed with the patient today and reassurance provided.  No treatment is medically indicated for these lesions at this time.  7. HEMANGIOMA OF SKIN  Generalized  Scattered on the patient's face, neck, trunk, and extremities, there are multiple small, round, cherry red- to purplish-colored, symmetric, uniform, vascular-appearing macules and papules  Cherry Angiomas - the  benign nature of these vascular lesions was discussed with the patient today and reassurance provided.  No treatment is medically indicated for these lesions at this time.  8. DIFFUSE PHOTODAMAGE OF SKIN (2)  Generalized, Photodistributed  Diffuse photodamage with actinic changes with telangiectasia and mottled pigmentation in sun-exposed areas.  Photodamage.  The signs and symptoms of skin cancer were reviewed and the patient was advised to practice sun protection and sun avoidance, use daily sunscreen, and perform regular self skin exams.  Sun protection was discussed, including avoiding the mid-day sun, wearing a sunscreen with SPF at least 50, and stressing the need for reapplication of sunscreen and applying more than they think they need.  9. LONG TERM USE OF DRUG  Generalized  10. ENCOUNTER FOR SCREENING FOR MALIGNANT NEOPLASM OF SKIN  Generalized         Seen and discussed with attending physician Dr. Юлия Aragon MD, PhD  Resident, Dermatology        I saw and evaluated the patient. I personally obtained the key and critical portions of the history and physical exam or was physically present for key and critical portions performed by the resident/fellow. I reviewed the resident/fellow's documentation and discussed the patient with the resident/fellow. I agree with the resident/fellow's medical decision making as documented in the note.    Jeovany Redman MD         [1]   Current Outpatient Medications:     amLODIPine (Norvasc) 5 mg tablet, Take 1 tablet (5 mg) by mouth once daily., Disp: , Rfl:     benzoyl peroxide 5 % lotion, Apply 1 Application topically., Disp: , Rfl:     benztropine (Cogentin) 0.5 mg tablet, Take 1 tablet (0.5 mg) by mouth twice a day., Disp: , Rfl:     busPIRone (Buspar) 10 mg tablet, Take 1 tablet (10 mg) by mouth twice a day., Disp: , Rfl:     busPIRone (Buspar) 15 mg tablet, , Disp: , Rfl:     clindamycin (Cleocin T) 1 % lotion, Apply 1 Application topically., Disp: ,  Rfl:     cyclobenzaprine (Flexeril) 10 mg tablet, Take 1 tablet (10 mg) by mouth 3 times a day as needed., Disp: , Rfl:     docusate sodium 250 mg capsule, Take 1 capsule (250 mg) by mouth once daily as needed (To be used while narcotics are being taken)., Disp: 20 capsule, Rfl: 0    escitalopram (Lexapro) 10 mg tablet, , Disp: , Rfl:     escitalopram (Lexapro) 20 mg tablet, , Disp: , Rfl:     fluocinonide 0.05 % cream, Apply 1 Application topically., Disp: , Rfl:     hydrOXYzine pamoate (Vistaril) 25 mg capsule, , Disp: , Rfl:     ibuprofen 600 mg tablet, Take 1 tablet (600 mg) by mouth every 6 hours if needed for mild pain (1 - 3)., Disp: 30 tablet, Rfl: 0    losartan (Cozaar) 100 mg tablet, Take 1 tablet (100 mg) by mouth once daily., Disp: , Rfl:     lovastatin (Mevacor) 40 mg tablet, Take 1 tablet (40 mg) by mouth once daily in the evening. Take with meals., Disp: , Rfl:     nicotine (Nicoderm CQ) 14 mg/24 hr patch, Place 1 patch on the skin once daily., Disp: , Rfl:     oxyCODONE-acetaminophen (Percocet) 5-325 mg tablet, Take 1-2 tablets by mouth every 6 hours if needed for severe pain (7 - 10)., Disp: 15 tablet, Rfl: 0    prazosin (Minipress) 2 mg capsule, , Disp: , Rfl:     risperiDONE (RisperDAL) 2 mg tablet, , Disp: , Rfl:     triamcinolone (Kenalog) 0.025 % cream, Apply 1 Application topically., Disp: , Rfl:     Vitamin D2 1,250 mcg (50,000 unit) capsule, , Disp: , Rfl:     folic acid (Folvite) 1 mg tablet, Take 1 tablet (1 mg) by mouth once daily. Except on days you take methotrexate., Disp: 30 tablet, Rfl: 0    ketoconazole (NIZOral) 2 % shampoo, Wash affected areas of scalp 2-3 times weekly as directed, Disp: 120 mL, Rfl: 11    methotrexate (Trexall) 2.5 mg tablet, Take 6 tablets (15 mg total) by mouth 1 (one) time per week.  Follow directions carefully, and ask to explain any part you do not understand. Take exactly as directed.Take 6 tablets 1 time per week., Disp: 24 tablet, Rfl: 2

## 2025-06-18 NOTE — Clinical Note
Clinically benign- to slightly atypical-appearing nevi - the clinically benign- to slightly atypical-appearing nature of the remainder of the patient's nevi was discussed with the patient today.  None of the patient's nevi, with the exception of the one noted above, meet threshold for biopsy today.  We emphasized the importance of performing monthly self-skin exams using the ABCDs of monitoring moles, which were reviewed with the patient today and an informational hand-out provided.  We also emphasized the importance of sun avoidance and sun protection with daily sunscreen use.

## 2025-06-18 NOTE — Clinical Note
Lymphomatoid Papulosis - previously significantly improved and well-controlled since increasing her dose of Methotrexate on 2/6/19 and since re-starting MTX in August 2016, but then with progressively worsening flare since self-discontinuation in early October 2021 and now significantly improved and well-controlled again since resuming systemic therapy with Methotrexate following her visit on 8/16/22.  The potentially chronic and intermittently flaring nature of this condition and treatment options were discussed extensively with the patient again today.  We again spent a great deal of time discussing various treatment options, including topical therapy, UV phototherapy, and systemic therapy, such as with Methotrexate, which she restarted in August 2016 after achieving significant improvement and good control while first on the medication for several months.  After discussing the risks, benefits, and side effects of each of these options at length, including hepatotoxicity and multiple additional potential side effects associated with Methotrexate, the patient expressed understanding and wishes to continue her systemic therapy with her most recent increased dose of Methotrexate 15 mg PO once weekly.     Of note, she had her most recent set of routine monitoring labs, including CBC w/ Diff and CMP, drawn on 6/13/25, which were within acceptable limits.  Thus, given her condition has improved significantly since restarting therapy, she states she has been tolerating the medication well again without any side effects noted, and her routine monitoring labs remain within acceptable limits and her Hepatitis serologies negative and as approved by Hepatology, we recommend she continue her current systemic therapy with Methotrexate 15 mg PO once weekly as well as Folic Acid 1 mg PO once daily except the day she takes MTX, which she takes on Fridays.  She was also instructed to have routine monitoring labs drawn once every 3  months.     In addition, for new lesions or future flares, we recommend she resume combination topical therapy with Clindamycin 1% lotion and Fluocinonide 0.05% cream, which she was instructed to apply twice daily to the affected lesions on her body (avoid face, groin, body folds) for the next 2 weeks, followed by taper to twice daily on weekends only for persistent lesions; she may repeat treatment in a 2-week burst-and-taper fashion every 6-8 weeks as needed for new lesions or future flares.  The risks, benefits, and side effects of these medications, including possible skin atrophy with overuse of topical steroids, were discussed.     In addition, given her long-term Methotrexate therapy, she was again instructed to follow-up with Hepatology here at , such as Dr. Paul James, who she saw on 6/17/20 for continued evaluation for possible Methotrexate-induced hepatotoxicity.  Of note, she recently had a FibroScan performed on 7/17/24, which estimated a fibrosis stage of 0-1 and steatosis grade of 3.  She currently followed steatotic liver disease by Dr. Quintana in Hepatology and is currently being monitored with serial LFTs and CBCs with plan for repeat fibroscans and surveillance ultrasounds approximately every 3 years.  She was also again instructed to follow-up with her primary care physician, Dr. Jazmyn Lindsey, to discuss her elevated serum glucose level, and she was instructed to return to our office in 2-3 months for re-evaluation and to have repeat monitoring labs drawn again at that time (every 3 months).  She expressed understanding and is inagreement with this plan.

## 2025-06-18 NOTE — Clinical Note
Scattered on her trunk and extremities, there are several similar-appearing pink to hyperpigmented macules consistent with postinflammatory hyperpigmentation, but no inflammatory papules or lesions suspicious for lymphomatoid papulosis on exam today

## 2025-06-18 NOTE — Clinical Note
Scattered on the patient's left lateral upper cheek, there are 2 erythematous, gritty, scaly macules

## 2025-06-18 NOTE — Clinical Note
Actinic Keratoses - scattered on left lateral upper cheek.  The pre-cancerous nature of these lesions and treatment options were discussed with the patient today.  At this time, we recommend treatment with liquid nitrogen cryotherapy.  The patient expressed understanding, is in agreement with this plan, and wishes to proceed with cryotherapy today.

## 2025-06-23 LAB
LAB AP ASR DISCLAIMER: NORMAL
LABORATORY COMMENT REPORT: NORMAL
PATH REPORT.FINAL DX SPEC: NORMAL
PATH REPORT.GROSS SPEC: NORMAL
PATH REPORT.MICROSCOPIC SPEC OTHER STN: NORMAL
PATH REPORT.RELEVANT HX SPEC: NORMAL
PATH REPORT.TOTAL CANCER: NORMAL

## 2025-06-26 DIAGNOSIS — D22.9 NEVUS: Primary | ICD-10-CM

## 2025-08-07 ENCOUNTER — TELEPHONE (OUTPATIENT)
Dept: DERMATOLOGY | Facility: CLINIC | Age: 65
End: 2025-08-07
Payer: COMMERCIAL

## 2025-08-07 DIAGNOSIS — C86.60 LYMPHOMATOID PAPULOSIS: Primary | ICD-10-CM

## 2025-08-07 RX ORDER — FOLIC ACID 1 MG/1
1 TABLET ORAL DAILY
COMMUNITY
End: 2025-08-07 | Stop reason: SDUPTHER

## 2025-08-07 RX ORDER — FOLIC ACID 1 MG/1
TABLET ORAL
Qty: 24 TABLET | Refills: 5 | Status: SHIPPED | OUTPATIENT
Start: 2025-08-07

## 2025-08-07 NOTE — TELEPHONE ENCOUNTER
Pt  went to  her Methotrexate and found that she needs refill for Folic Acid  sent to her pharmacy [facility]-- last rf for folic acid was 06/2025 with 0 rfs ///

## 2025-09-10 ENCOUNTER — APPOINTMENT (OUTPATIENT)
Dept: DERMATOLOGY | Facility: CLINIC | Age: 65
End: 2025-09-10
Payer: COMMERCIAL

## 2025-10-09 ENCOUNTER — APPOINTMENT (OUTPATIENT)
Dept: DERMATOLOGY | Facility: CLINIC | Age: 65
End: 2025-10-09
Payer: COMMERCIAL

## 2025-11-12 ENCOUNTER — APPOINTMENT (OUTPATIENT)
Dept: DERMATOLOGY | Facility: CLINIC | Age: 65
End: 2025-11-12
Payer: COMMERCIAL

## 2025-12-11 ENCOUNTER — APPOINTMENT (OUTPATIENT)
Dept: DERMATOLOGY | Facility: CLINIC | Age: 65
End: 2025-12-11
Payer: COMMERCIAL